# Patient Record
Sex: MALE | Race: WHITE | Employment: FULL TIME | URBAN - METROPOLITAN AREA
[De-identification: names, ages, dates, MRNs, and addresses within clinical notes are randomized per-mention and may not be internally consistent; named-entity substitution may affect disease eponyms.]

---

## 2018-04-13 ENCOUNTER — ANESTHESIA EVENT (OUTPATIENT)
Dept: SURGERY | Age: 38
End: 2018-04-13
Payer: COMMERCIAL

## 2018-04-13 ENCOUNTER — HOSPITAL ENCOUNTER (OUTPATIENT)
Age: 38
Setting detail: OUTPATIENT SURGERY
Discharge: HOME | End: 2018-04-13
Attending: ORTHOPAEDIC SURGERY | Admitting: ORTHOPAEDIC SURGERY
Payer: COMMERCIAL

## 2018-04-13 ENCOUNTER — ANESTHESIA (OUTPATIENT)
Dept: SURGERY | Age: 38
End: 2018-04-13
Payer: COMMERCIAL

## 2018-04-13 VITALS
WEIGHT: 195 LBS | HEIGHT: 69 IN | SYSTOLIC BLOOD PRESSURE: 112 MMHG | RESPIRATION RATE: 12 BRPM | TEMPERATURE: 97.3 F | OXYGEN SATURATION: 95 % | DIASTOLIC BLOOD PRESSURE: 71 MMHG | BODY MASS INDEX: 28.88 KG/M2 | HEART RATE: 88 BPM

## 2018-04-13 PROCEDURE — 77030003666 HC NDL SPINAL BD -A: Performed by: ORTHOPAEDIC SURGERY

## 2018-04-13 PROCEDURE — 77030027384 HC PRB ARTHSCP SERFAS STRY -C: Performed by: ORTHOPAEDIC SURGERY

## 2018-04-13 PROCEDURE — 76210000063 HC OR PH I REC FIRST 0.5 HR: Performed by: ORTHOPAEDIC SURGERY

## 2018-04-13 PROCEDURE — 77030018673: Performed by: ORTHOPAEDIC SURGERY

## 2018-04-13 PROCEDURE — 77030034139 HC NDL ENDOSC TRUPASS SGL S&N -C: Performed by: ORTHOPAEDIC SURGERY

## 2018-04-13 PROCEDURE — 74011250636 HC RX REV CODE- 250/636: Performed by: ORTHOPAEDIC SURGERY

## 2018-04-13 PROCEDURE — 77030016570 HC BLNKT BAIR HGGR 3M -B: Performed by: ANESTHESIOLOGY

## 2018-04-13 PROCEDURE — 77030033005 HC TBNG ARTHSC PMP STRY -B: Performed by: ORTHOPAEDIC SURGERY

## 2018-04-13 PROCEDURE — 77030020143 HC AIRWY LARYN INTUB CGAS -A: Performed by: ANESTHESIOLOGY

## 2018-04-13 PROCEDURE — 77030002933 HC SUT MCRYL J&J -A: Performed by: ORTHOPAEDIC SURGERY

## 2018-04-13 PROCEDURE — 76010010054 HC POST OP PAIN BLOCK: Performed by: ORTHOPAEDIC SURGERY

## 2018-04-13 PROCEDURE — 77030035253 HC BIT DRL ICONIX DISP STRY -B: Performed by: ORTHOPAEDIC SURGERY

## 2018-04-13 PROCEDURE — C1713 ANCHOR/SCREW BN/BN,TIS/BN: HCPCS | Performed by: ORTHOPAEDIC SURGERY

## 2018-04-13 PROCEDURE — 74011250637 HC RX REV CODE- 250/637: Performed by: ANESTHESIOLOGY

## 2018-04-13 PROCEDURE — 76010000161 HC OR TIME 1 TO 1.5 HR INTENSV-TIER 1: Performed by: ORTHOPAEDIC SURGERY

## 2018-04-13 PROCEDURE — 76210000020 HC REC RM PH II FIRST 0.5 HR: Performed by: ORTHOPAEDIC SURGERY

## 2018-04-13 PROCEDURE — 77030003602 HC NDL NRV BLK BBMI -B: Performed by: ANESTHESIOLOGY

## 2018-04-13 PROCEDURE — 77030032490 HC SLV COMPR SCD KNE COVD -B: Performed by: ORTHOPAEDIC SURGERY

## 2018-04-13 PROCEDURE — 74011250636 HC RX REV CODE- 250/636: Performed by: ANESTHESIOLOGY

## 2018-04-13 PROCEDURE — 77030010427: Performed by: ORTHOPAEDIC SURGERY

## 2018-04-13 PROCEDURE — 77030018836 HC SOL IRR NACL ICUM -A: Performed by: ORTHOPAEDIC SURGERY

## 2018-04-13 PROCEDURE — 77030019605: Performed by: ORTHOPAEDIC SURGERY

## 2018-04-13 PROCEDURE — 76942 ECHO GUIDE FOR BIOPSY: CPT | Performed by: ORTHOPAEDIC SURGERY

## 2018-04-13 PROCEDURE — 77030004453 HC BUR SHV STRY -B: Performed by: ORTHOPAEDIC SURGERY

## 2018-04-13 PROCEDURE — 74011250636 HC RX REV CODE- 250/636

## 2018-04-13 PROCEDURE — 74011000250 HC RX REV CODE- 250

## 2018-04-13 PROCEDURE — 76060000033 HC ANESTHESIA 1 TO 1.5 HR: Performed by: ORTHOPAEDIC SURGERY

## 2018-04-13 PROCEDURE — 77030006668 HC BLD SHV MENSCS STRY -B: Performed by: ORTHOPAEDIC SURGERY

## 2018-04-13 PROCEDURE — 77030033073 HC TBNG ARTHSC PMP OUTFLO STRY -B: Performed by: ORTHOPAEDIC SURGERY

## 2018-04-13 PROCEDURE — 77030010430: Performed by: ORTHOPAEDIC SURGERY

## 2018-04-13 DEVICE — MULTIFIX S-ULTRA 5.5MM KNOTLESS ANCHOR
Type: IMPLANTABLE DEVICE | Site: SHOULDER | Status: FUNCTIONAL
Brand: MULTIFIX

## 2018-04-13 DEVICE — 1.4MM ICONIX 1 ANCHOR W/INTELLIBRAID TECHNOLOGY 1 STRAND #2 FORCE FIBER
Type: IMPLANTABLE DEVICE | Site: SHOULDER | Status: FUNCTIONAL
Brand: ICONIX

## 2018-04-13 DEVICE — ICONIX 1 TT WITH INTELLIBRAID TECHNOLOGY, 1.4MM ANCHOR WITH 1 STRAND 1.2MM XBRAID TT
Type: IMPLANTABLE DEVICE | Site: SHOULDER | Status: FUNCTIONAL
Brand: ICONIX

## 2018-04-13 RX ORDER — HYDROMORPHONE HYDROCHLORIDE 2 MG/ML
0.5 INJECTION, SOLUTION INTRAMUSCULAR; INTRAVENOUS; SUBCUTANEOUS
Status: DISCONTINUED | OUTPATIENT
Start: 2018-04-13 | End: 2018-04-13 | Stop reason: HOSPADM

## 2018-04-13 RX ORDER — NALOXONE HYDROCHLORIDE 0.4 MG/ML
0.2 INJECTION, SOLUTION INTRAMUSCULAR; INTRAVENOUS; SUBCUTANEOUS AS NEEDED
Status: DISCONTINUED | OUTPATIENT
Start: 2018-04-13 | End: 2018-04-13 | Stop reason: HOSPADM

## 2018-04-13 RX ORDER — CEFAZOLIN SODIUM/WATER 2 G/20 ML
2 SYRINGE (ML) INTRAVENOUS ONCE
Status: COMPLETED | OUTPATIENT
Start: 2018-04-13 | End: 2018-04-13

## 2018-04-13 RX ORDER — LIDOCAINE HYDROCHLORIDE 20 MG/ML
INJECTION, SOLUTION EPIDURAL; INFILTRATION; INTRACAUDAL; PERINEURAL AS NEEDED
Status: DISCONTINUED | OUTPATIENT
Start: 2018-04-13 | End: 2018-04-13 | Stop reason: HOSPADM

## 2018-04-13 RX ORDER — ONDANSETRON 2 MG/ML
INJECTION INTRAMUSCULAR; INTRAVENOUS AS NEEDED
Status: DISCONTINUED | OUTPATIENT
Start: 2018-04-13 | End: 2018-04-13 | Stop reason: HOSPADM

## 2018-04-13 RX ORDER — FAMOTIDINE 20 MG/1
20 TABLET, FILM COATED ORAL ONCE
Status: COMPLETED | OUTPATIENT
Start: 2018-04-13 | End: 2018-04-13

## 2018-04-13 RX ORDER — MIDAZOLAM HYDROCHLORIDE 1 MG/ML
2 INJECTION, SOLUTION INTRAMUSCULAR; INTRAVENOUS
Status: DISCONTINUED | OUTPATIENT
Start: 2018-04-13 | End: 2018-04-13 | Stop reason: HOSPADM

## 2018-04-13 RX ORDER — FENTANYL CITRATE 50 UG/ML
INJECTION, SOLUTION INTRAMUSCULAR; INTRAVENOUS AS NEEDED
Status: DISCONTINUED | OUTPATIENT
Start: 2018-04-13 | End: 2018-04-13 | Stop reason: HOSPADM

## 2018-04-13 RX ORDER — SODIUM CHLORIDE, SODIUM LACTATE, POTASSIUM CHLORIDE, CALCIUM CHLORIDE 600; 310; 30; 20 MG/100ML; MG/100ML; MG/100ML; MG/100ML
75 INJECTION, SOLUTION INTRAVENOUS CONTINUOUS
Status: DISCONTINUED | OUTPATIENT
Start: 2018-04-13 | End: 2018-04-13 | Stop reason: HOSPADM

## 2018-04-13 RX ORDER — EPINEPHRINE 1 MG/ML
INJECTION, SOLUTION, CONCENTRATE INTRAVENOUS AS NEEDED
Status: DISCONTINUED | OUTPATIENT
Start: 2018-04-13 | End: 2018-04-13 | Stop reason: HOSPADM

## 2018-04-13 RX ORDER — DEXAMETHASONE SODIUM PHOSPHATE 4 MG/ML
INJECTION, SOLUTION INTRA-ARTICULAR; INTRALESIONAL; INTRAMUSCULAR; INTRAVENOUS; SOFT TISSUE AS NEEDED
Status: DISCONTINUED | OUTPATIENT
Start: 2018-04-13 | End: 2018-04-13 | Stop reason: HOSPADM

## 2018-04-13 RX ORDER — PROPOFOL 10 MG/ML
INJECTION, EMULSION INTRAVENOUS AS NEEDED
Status: DISCONTINUED | OUTPATIENT
Start: 2018-04-13 | End: 2018-04-13 | Stop reason: HOSPADM

## 2018-04-13 RX ORDER — SODIUM CHLORIDE 0.9 % (FLUSH) 0.9 %
5-10 SYRINGE (ML) INJECTION AS NEEDED
Status: DISCONTINUED | OUTPATIENT
Start: 2018-04-13 | End: 2018-04-13 | Stop reason: HOSPADM

## 2018-04-13 RX ORDER — LIDOCAINE HYDROCHLORIDE 10 MG/ML
0.1 INJECTION INFILTRATION; PERINEURAL AS NEEDED
Status: DISCONTINUED | OUTPATIENT
Start: 2018-04-13 | End: 2018-04-13 | Stop reason: HOSPADM

## 2018-04-13 RX ORDER — OXYCODONE AND ACETAMINOPHEN 5; 325 MG/1; MG/1
1 TABLET ORAL AS NEEDED
Status: DISCONTINUED | OUTPATIENT
Start: 2018-04-13 | End: 2018-04-13 | Stop reason: HOSPADM

## 2018-04-13 RX ADMIN — SODIUM CHLORIDE, SODIUM LACTATE, POTASSIUM CHLORIDE, AND CALCIUM CHLORIDE: 600; 310; 30; 20 INJECTION, SOLUTION INTRAVENOUS at 13:44

## 2018-04-13 RX ADMIN — LIDOCAINE HYDROCHLORIDE 85 MG: 20 INJECTION, SOLUTION EPIDURAL; INFILTRATION; INTRACAUDAL; PERINEURAL at 13:51

## 2018-04-13 RX ADMIN — SODIUM CHLORIDE, SODIUM LACTATE, POTASSIUM CHLORIDE, AND CALCIUM CHLORIDE: 600; 310; 30; 20 INJECTION, SOLUTION INTRAVENOUS at 14:26

## 2018-04-13 RX ADMIN — Medication 2 G: at 13:47

## 2018-04-13 RX ADMIN — ONDANSETRON 4 MG: 2 INJECTION INTRAMUSCULAR; INTRAVENOUS at 14:19

## 2018-04-13 RX ADMIN — SODIUM CHLORIDE, SODIUM LACTATE, POTASSIUM CHLORIDE, AND CALCIUM CHLORIDE 75 ML/HR: 600; 310; 30; 20 INJECTION, SOLUTION INTRAVENOUS at 12:45

## 2018-04-13 RX ADMIN — PROPOFOL 200 MG: 10 INJECTION, EMULSION INTRAVENOUS at 13:51

## 2018-04-13 RX ADMIN — DEXAMETHASONE SODIUM PHOSPHATE 10 MG: 4 INJECTION, SOLUTION INTRA-ARTICULAR; INTRALESIONAL; INTRAMUSCULAR; INTRAVENOUS; SOFT TISSUE at 14:03

## 2018-04-13 RX ADMIN — FAMOTIDINE 20 MG: 20 TABLET ORAL at 12:40

## 2018-04-13 RX ADMIN — MIDAZOLAM HYDROCHLORIDE 2 MG: 1 INJECTION, SOLUTION INTRAMUSCULAR; INTRAVENOUS at 13:45

## 2018-04-13 RX ADMIN — MIDAZOLAM HYDROCHLORIDE 2 MG: 1 INJECTION, SOLUTION INTRAMUSCULAR; INTRAVENOUS at 13:18

## 2018-04-13 RX ADMIN — FENTANYL CITRATE 50 MCG: 50 INJECTION, SOLUTION INTRAMUSCULAR; INTRAVENOUS at 14:06

## 2018-04-13 RX ADMIN — FENTANYL CITRATE 50 MCG: 50 INJECTION, SOLUTION INTRAMUSCULAR; INTRAVENOUS at 13:51

## 2018-04-13 NOTE — ANESTHESIA POSTPROCEDURE EVALUATION
Post-Anesthesia Evaluation and Assessment    Patient: Nirmal Vazquez MRN: [id number]  SSN: [id number]    YOB: 1980  Age: 42 y.o. Sex: male       Cardiovascular Function/Vital Signs  Visit Vitals    /68    Pulse 75    Temp 36.3 °C (97.3 °F)    Resp 12    Ht 5' 9\" (1.753 m)    Wt 88.5 kg (195 lb)    SpO2 90%    BMI 28.8 kg/m2       Patient is status post general anesthesia for Procedure(s):  LEFT SHOULDER ARTHROSCOPY ROTATOR CUFF REPAIR, Distal Clavicle Excision, Subacromial Decompression, Labral repair  GEN/SCAL. Nausea/Vomiting: None    Postoperative hydration reviewed and adequate. Pain:  Pain Scale 1: Visual (04/13/18 1508)  Pain Intensity 1: 0 (04/13/18 1508)   Managed    Neurological Status:   Neuro (WDL): Exceptions to WDL (04/13/18 1508)  Neuro  Neurologic State: Drowsy (04/13/18 1508)  LUE Motor Response: Pharmacologically paralyzed (04/13/18 1508)   At baseline    Mental Status and Level of Consciousness: Arousable    Pulmonary Status:   O2 Device: Nasal cannula (04/13/18 1508)   Adequate oxygenation and airway patent    Complications related to anesthesia: None    Post-anesthesia assessment completed.  No concerns    Signed By: Madiha Chaudhari MD     April 13, 2018

## 2018-04-13 NOTE — IP AVS SNAPSHOT
[address] [facility]       [address]  [phone]     Patient: Scotty Briggs  MRN: [id number]  :1980           About your hospitalization     You were admitted on:  2018 You last received care in the:  [facility] OP PACU    You were discharged on:  2018       Why you were hospitalized     Your primary diagnosis was:  Not on File      Follow-up Information     Follow up With Details Comments Sanjuanita Tapia MD Call today His office will call to schedule follow up appointment. Call with any questions or concerns. [address] [facility] [address]  [phone]        Your Scheduled Appointments     2018 10:00 AM EDT   Physicial Therapy with Nate Haley PT, DPT   [facility] (Anabel Santana)    [address] [facility] [phone]              Discharge Orders     None       A check alma indicates which time of day the medication should be taken. My Medications      Notice     You have not been prescribed any medications. Discharge Instructions        Post-Operative Instructions   For  Shoulder Arthroscopy Rotator Cuff/ Labral Repair  Phone:  [phone]    1. If you do not have an \"Iceman\" type cooling unit, for the first 48-72 hours following surgery, use ice on the shoulder every two hours (while awake) for 20-30 minutes at a time to help prevent swelling and lessen pain. If you have a cooling unit, follow the instructions given to you- continually as much as possible the first 48-72 hours, then 3-4 times a day for 4 weeks. 2. You may shower after the arthroscopy. Keep dressings in place for the first three days. After three days, you may remove the dressings and leave the steri-strip bandages in place; they will peel off naturally. 3. Stay in sling at all times except to shower.     4. Begin therapy as ordered. 5. Use any pain medication as instructed. You should take your pain medication as soon as you feel the anesthetic wearing off. Do not wait until you are in severe pain to begin taking your pain medication. 6. You may have some side effects from your pain medication. If you have nausea, try taking your medication with food. For itching, you may take over the counter Benadryl. 7. You may have been given a prescription for Zofran or Phenergan. This medication is used for nausea and vomiting. You do not need to get this prescription filled unless you have a problem. 8. If you have a problem, please call [address] [place] [address] at [phone]    [address] [place] [address], P.A[facility] ACTIVITY  · As tolerated and as directed by your doctor. · Bathe or shower as directed by your doctor. DIET  · Clear liquids until no nausea or vomiting; then light diet for the first day. · Advance to regular diet on second day, unless your doctor orders otherwise. · If nausea and vomiting continues, call your doctor. PAIN  · Take pain medication as directed by your doctor. · Call your doctor if pain is NOT relieved by medication. · DO NOT take aspirin of blood thinners unless directed by your doctor. DRESSING CARE       CALL YOUR DOCTOR IF   · Excessive bleeding that does not stop after holding pressure over the area  · Temperature of 101 degrees F or above  · Excessive redness, swelling or bruising, and/ or green or yellow, smelly discharge from incision      After general anesthesia or intravenous sedation, for 24 hours or while taking prescription Narcotics:  · Limit your activities  · Do not drive and operate hazardous machinery  · Do not make important personal or business decisions  · Do  not drink alcoholic beverages  · If you have not urinated within 8 hours after discharge, please contact your surgeon on call.     *  Please give a list of your current medications to your Primary Care Provider. *  Please update this list whenever your medications are discontinued, doses are      changed, or new medications (including over-the-counter products) are added. *  Please carry medication information at all times in case of emergency situations. These are general instructions for a healthy lifestyle:    No smoking/ No tobacco products/ Avoid exposure to second hand smoke    Surgeon General's Warning:  Quitting smoking now greatly reduces serious risk to your health. Obesity, smoking, and sedentary lifestyle greatly increases your risk for illness    A healthy diet, regular physical exercise & weight monitoring are important for maintaining a healthy lifestyle    You may be retaining fluid if you have a history of heart failure or if you experience any of the following symptoms:  Weight gain of 3 pounds or more overnight or 5 pounds in a week, increased swelling in our hands or feet or shortness of breath while lying flat in bed. Please call your doctor as soon as you notice any of these symptoms; do not wait until your next office visit. Recognize signs and symptoms of STROKE:    F-face looks uneven    A-arms unable to move or move unevenly    S-speech slurred or non-existent    T-time-call 911 as soon as signs and symptoms begin-DO NOT go       Back to bed or wait to see if you get better-TIME IS BRAIN. Introducing [facility] & HEALTH SERVICES! Glory Gray introduces [employer] patient portal. Now you can access parts of your medical record, email your doctor's office, and request medication refills online. 1. In your internet browser, go to [email or web address]   2. Click on the First Time User? Click Here link in the Sign In box. You will see the New Member Sign Up page. 3. Enter your [employer] Access Code exactly as it appears below. You will not need to use this code after youve completed the sign-up process.  If you do not sign up before the expiration date, you must request a new code. · [employer] Access Code: [email or web address]  Expires: 7/10/2018 10:26 AM    4. Enter the last four digits of your Social Security Number (xxxx) and Date of Birth (mm/dd/yyyy) as indicated and click Submit. You will be taken to the next sign-up page. 5. Create a [employer] ID. This will be your [employer] login ID and cannot be changed, so think of one that is secure and easy to remember. 6. Create a [employer] password. You can change your password at any time. 7. Enter your Password Reset Question and Answer. This can be used at a later time if you forget your password. 8. Enter your e-mail address. You will receive e-mail notification when new information is available in [address] 9. Click Sign Up. You can now view and download portions of your medical record. 10. Click the Download Summary menu link to download a portable copy of your medical information. If you have questions, please visit the Frequently Asked Questions section of the [employer] website. Remember, [employer] is NOT to be used for urgent needs. For medical emergencies, dial 911. Now available from your iPhone and Android! Introducing Dom Lawler       As a [employer] patient, I wanted to make you aware of our electronic visit tool called Dom Lawler. [employer] 24/7 allows you to connect within minutes with a medical provider 24 hours a day, seven days a week via a mobile device or tablet or logging into a secure website from your computer. You can access Dom Lawler from anywhere in the [place]. A virtual visit might be right for you when you have a simple condition and feel like you just dont want to get out of bed, or cant get away from work for an appointment, when your regular [employer] provider is not available (evenings, weekends or holidays), or when youre out of town and need minor care.   Electronic visits cost only $49 and if the [facility] [employer] 24/7 provider determines a prescription is needed to treat your condition, one can be electronically transmitted to a nearby pharmacy*. Please take a moment to enroll today if you have not already done so. The enrollment process is free and takes just a few minutes. To enroll, please download the [employer] 24/7 usama to your tablet or phone, or visit [email or web address] to enroll on your computer. And, as an [address] [place] [address] patient with a [employer] account, the results of your visits will be scanned into your electronic medical record and your primary care provider will be able to view the scanned results. We urge you to continue to see your regular Nicolasabennie Alfonso provider for your ongoing medical care. And while your primary care provider may not be the one available when you seek a Porphyriovenkateshfin virtual visit, the peace of mind you get from getting a real diagnosis real time can be priceless. For more information on Dom Fingovenkateshfin, view our Frequently Asked Questions (FAQs) at [email or web address].     Sincerely,    Goran Yen MD  Chief Medical Officer  [facility]     *:  certain medications cannot be prescribed via [employer] 24/7         Providers Seen During Your Hospitalization     Provider Specialty Primary office phone    Jose A Chandler MD Orthopedic Surgery [phone]      Your Primary Care Physician (PCP)     Primary Care Physician Office Phone Office Fax    NONE ** None ** ** None **      You are allergic to the following     Allergen Reactions    Sulfa (Sulfonamide Antibiotics) Unknown (comments)      Recent Documentation     Height Weight BMI Smoking Status          1.753 m 88.5 kg 28.8 kg/m2 Never Smoker           Emergency Contacts       Name Discharge Info Relation Home Work Mobile    Juan  Spouse [3] [phone]      Patient Belongings     The following personal items are in your possession at time of discharge:    Dental Appliances: None  Visual Aid: None      Home Medications: None   Jewelry: None  Clothing: Shirt, Pants, Footwear, Undergarments, Socks    Other Valuables: None              Please provide this summary of care documentation to your next provider. Signatures-by signing, you are acknowledging that this After Visit Summary has been reviewed with you and you have received a copy.                       Patient Signature:  ____________________________________________________________    Date:  ____________________________________________________________      `           Provider Signature:  ____________________________________________________________    Date:  ____________________________________________________________

## 2018-04-13 NOTE — DISCHARGE INSTRUCTIONS
Post-Operative Instructions   For  Shoulder Arthroscopy Rotator Cuff/ Labral Repair  Phone:  [phone]    1. If you do not have an \"Iceman\" type cooling unit, for the first 48-72 hours following surgery, use ice on the shoulder every two hours (while awake) for 20-30 minutes at a time to help prevent swelling and lessen pain. If you have a cooling unit, follow the instructions given to you- continually as much as possible the first 48-72 hours, then 3-4 times a day for 4 weeks. 2. You may shower after the arthroscopy. Keep dressings in place for the first three days. After three days, you may remove the dressings and leave the steri-strip bandages in place; they will peel off naturally. 3. Stay in sling at all times except to shower. 4. Begin therapy as ordered. 5. Use any pain medication as instructed. You should take your pain medication as soon as you feel the anesthetic wearing off. Do not wait until you are in severe pain to begin taking your pain medication. 6. You may have some side effects from your pain medication. If you have nausea, try taking your medication with food. For itching, you may take over the counter Benadryl. 7. You may have been given a prescription for Zofran or Phenergan. This medication is used for nausea and vomiting. You do not need to get this prescription filled unless you have a problem. 8. If you have a problem, please call [address] [place] [address] at [phone]    [address] [place] [address], P.A. ACTIVITY  · As tolerated and as directed by your doctor. · Bathe or shower as directed by your doctor. DIET  · Clear liquids until no nausea or vomiting; then light diet for the first day. · Advance to regular diet on second day, unless your doctor orders otherwise. · If nausea and vomiting continues, call your doctor. PAIN  · Take pain medication as directed by your doctor.    · Call your doctor if pain is NOT relieved by medication. · DO NOT take aspirin of blood thinners unless directed by your doctor. DRESSING CARE       CALL YOUR DOCTOR IF   · Excessive bleeding that does not stop after holding pressure over the area  · Temperature of 101 degrees F or above  · Excessive redness, swelling or bruising, and/ or green or yellow, smelly discharge from incision      After general anesthesia or intravenous sedation, for 24 hours or while taking prescription Narcotics:  · Limit your activities  · Do not drive and operate hazardous machinery  · Do not make important personal or business decisions  · Do  not drink alcoholic beverages  · If you have not urinated within 8 hours after discharge, please contact your surgeon on call. *  Please give a list of your current medications to your Primary Care Provider. *  Please update this list whenever your medications are discontinued, doses are      changed, or new medications (including over-the-counter products) are added. *  Please carry medication information at all times in case of emergency situations. These are general instructions for a healthy lifestyle:    No smoking/ No tobacco products/ Avoid exposure to second hand smoke    Surgeon General's Warning:  Quitting smoking now greatly reduces serious risk to your health. Obesity, smoking, and sedentary lifestyle greatly increases your risk for illness    A healthy diet, regular physical exercise & weight monitoring are important for maintaining a healthy lifestyle    You may be retaining fluid if you have a history of heart failure or if you experience any of the following symptoms:  Weight gain of 3 pounds or more overnight or 5 pounds in a week, increased swelling in our hands or feet or shortness of breath while lying flat in bed. Please call your doctor as soon as you notice any of these symptoms; do not wait until your next office visit.     Recognize signs and symptoms of STROKE:    F-face looks uneven    A-arms unable to move or move unevenly    S-speech slurred or non-existent    T-time-call 911 as soon as signs and symptoms begin-DO NOT go       Back to bed or wait to see if you get better-TIME IS BRAIN.

## 2018-04-13 NOTE — ANESTHESIA PREPROCEDURE EVALUATION
Anesthetic History   No history of anesthetic complications            Review of Systems / Medical History  Patient summary reviewed and pertinent labs reviewed    Pulmonary  Within defined limits                 Neuro/Psych   Within defined limits           Cardiovascular                  Exercise tolerance: >4 METS     GI/Hepatic/Renal  Within defined limits              Endo/Other  Within defined limits           Other Findings              Physical Exam    Airway  Mallampati: II  TM Distance: 4 - 6 cm  Neck ROM: normal range of motion   Mouth opening: Normal     Cardiovascular    Rhythm: regular           Dental  No notable dental hx       Pulmonary  Breath sounds clear to auscultation               Abdominal         Other Findings            Anesthetic Plan    ASA: 2  Anesthesia type: general      Post-op pain plan if not by surgeon: peripheral nerve block single    Induction: Intravenous  Anesthetic plan and risks discussed with: Patient

## 2018-04-13 NOTE — ANESTHESIA PROCEDURE NOTES
Peripheral Block    Start time: 4/13/2018 1:18 PM  End time: 4/13/2018 1:22 PM  Performed by: Ivan Alonzo  Authorized by: Ivan Alonzo       Pre-procedure: Indications: at surgeon's request and post-op pain management    Preanesthetic Checklist: patient identified, risks and benefits discussed, site marked, timeout performed, anesthesia consent given and patient being monitored    Timeout Time: 13:18          Block Type:   Block Type:   Interscalene  Laterality:  Left  Monitoring:  Standard ASA monitoring, responsive to questions, oxygen, continuous pulse ox, frequent vital sign checks and heart rate  Injection Technique:  Single shot  Procedures: ultrasound guided    Patient Position: seated  Prep: chlorhexidine    Location:  Interscalene  Needle Type:  Stimuplex  Needle Gauge:  22 G  Needle Localization:  Ultrasound guidance  Medication Injected:  0.375%  bupivacaine  Adds:  Epi 1:400K  Volume (mL):  25  mepivacaine  Volume (mL):  10    Assessment:  Number of attempts:  1  Injection Assessment:  Incremental injection every 5 mL, negative aspiration for CSF, no paresthesia, ultrasound image on chart, no intravascular symptoms, negative aspiration for blood and local visualized surrounding nerve on ultrasound  Patient tolerance:  Patient tolerated the procedure well with no immediate complications

## 2018-04-13 NOTE — H&P
Outpatient Surgery History and Physical:  Salma Hernandes was seen and examined. CHIEF COMPLAINT:   Left shoulder pain. PE:     Visit Vitals    /81 (BP 1 Location: Right arm, BP Patient Position: Supine)    Pulse 74    Temp 98 °F (36.7 °C)    Resp 18    Wt 88.5 kg (195 lb)    SpO2 97%    BMI 28.8 kg/m2       Heart:   Regular rhythm      Lungs:  Are clear      Past Medical History:    Patient Active Problem List    Diagnosis    Calculus of gallbladder without cholecystitis without obstruction       Surgical History:   Past Surgical History:   Procedure Laterality Date    HX LAP CHOLECYSTECTOMY  2016       Social History: Patient  reports that he has never smoked. He has never used smokeless tobacco. He reports that he drinks alcohol. He reports that he does not use illicit drugs. Family History:   Family History   Problem Relation Age of Onset    Diabetes Mother     Hypertension Father     Diabetes Sister        Allergies: Reviewed per EMR  Allergies   Allergen Reactions    Sulfa (Sulfonamide Antibiotics) Unknown (comments)       Medications:    No current facility-administered medications on file prior to encounter. No current outpatient prescriptions on file prior to encounter. The surgery is planned for the left shoulder. History and physical has been reviewed. The patient has been examined. There have been no significant clinical changes since the completion of the originally dated History and Physical.  Patient identified by surgeon; surgical site was confirmed by patient and surgeon. The patient is here today for outpatient surgery. I have examined the patient, no changes are noted in the patient's medical status. Necessity for the procedure/care is still present and the history and physical above is current. See the office notes for the full long term history of the problem.   Please see the recent office notes for the musculoskeletal examination.     Signed By: RAFAL Ho     April 13, 2018 1:22 PM

## 2018-04-16 NOTE — OP NOTES
[facility] REPORT    Jerad Covington  MR#: [id number]  : 1980  ACCOUNT #: [de-identified]   DATE OF SERVICE: 2018    PREOPERATIVE DIAGNOSES  1. Rotator cuff tear. 2.  Anterior labral tear. 3.  Acromioclavicular arthritis. 4.  Chondromalacia of glenoid. 5.  Impingement, left shoulder. POSTOPERATIVE DIAGNOSES   1. Rotator cuff tear. 2.  Anterior labral tear. 3.  Acromioclavicular arthritis. 4.  Chondromalacia of glenoid. 5.  Impingement, left shoulder. OPERATION PERFORMED  1. Arthroscopic rotator cuff repair. 2.  Arthroscopic labral repair. 3.  Arthroscopic distal clavicle excision ([place] procedure). 4.  Abrasion arthroplasty, glenoid. 5.  Arthroscopic subacromial decompression, left shoulder. SURGEON:  Annalee Shields MD      ASSISTANTS:  RAFAL Campa    ANESTHESIA:  General.    FLUIDS:  Crystalloid. ESTIMATED BLOOD LOSS:  Minimal.    SPECIMENS:  None. COMPLICATIONS:  None. IMPLANTS:  Yes, see record. FINDINGS:  There was a 1 cm full thickness rotator cuff tear. There was an anterior inferior labral tear. There was grade II/III/IV chondromalacia of the anterior glenoid. There was an anterior inferior acromial slope. There was undersurface osteophyte formation and degenerative change of the distal clavicle at the [facility] joint. DESCRIPTION OF PROCEDURE:  After informed consent, the patient was taken to the operating room and placed in supine position. General endotracheal anesthesia was administered without difficulty. The patient was placed in lateral decubitus position. All pressure points were inspected and padded appropriately. The left upper extremity was suspended by traction. Left shoulder was prepped and draped in a sterile fashion. The glenohumeral joint was insufflated with 50 mL of sterile saline and a posterior portal was established.   Glenohumeral joint was arthroscoped in a sequential manner. The aforementioned findings were noted. An anterior portal was established and a chondroplasty of the glenoid was performed. All loose cartilage flaps were removed. There were no sharp edges or unstable fragments after the chondroplasty of the glenoid. There was an area of exposed bone and a contained defect. An abrasion arthroplasty was performed down to bleeding subchondral bone without difficulty. Attention was then directed to the labral tear. The labrum was anatomically reduced and it was fixed with Iconix suture anchors and excellent purchase. These were placed in the original insertion at the articular margin. Anatomic repair of the anterior inferior labrum was performed. The labral repair was performed without difficulty. The labrum was stable on probing after the repair. The scope was brought in the subacromial space. A lateral portal was established. Partial closure of tissue was excised. The undersurface of the acromion was exposed and then acromioplasty was performed. All of the acromion anterior to the leading edge of distal clavicle was excised to a depth of 5-6 mm and 2 cm anterior to posterior direction was removed. This opened up the subacromial space nicely. Attention was then directed to the distal clavicle to both anterolateral portals. A circumferential distal clavicle excision was performed and approximately 10 mm of distal clavicle was excised. Circumferential excision was verified arthroscopically and a [place] procedure was performed without difficulty. Attention was then directed to the rotator cuff. The tear was easily mobilized back to its original insertion and the area underneath the original insertion was lightly decorticated. A Multifix anchor and Ultratape sutures in a mattress fashion were used to completely repair the tear. Anatomic rotator cuff repair was performed. The rotator cuff tear was repaired without difficulty.   Shoulder was thoroughly irrigated. Portals were closed. Sterile dressings were applied. The patient was taken to recovery in stable condition. RAFAL [place], assisted during the procedure and was necessary for patient positioning during the procedure, wound closure and assistance with the major portions of the operation. His presence decreased the operative time and potential complication rate.       MD CAPRICE Gil / [place]  D: 04/16/2018 07:48     T: 04/16/2018 08:18  JOB #: [id number]

## 2018-04-20 ENCOUNTER — APPOINTMENT (OUTPATIENT)
Dept: PHYSICAL THERAPY | Age: 38
End: 2018-04-20

## 2018-04-20 ENCOUNTER — HOSPITAL ENCOUNTER (OUTPATIENT)
Dept: PHYSICAL THERAPY | Age: 38
Discharge: HOME | End: 2018-04-20
Payer: COMMERCIAL

## 2018-04-20 PROCEDURE — 97161 PT EVAL LOW COMPLEX 20 MIN: CPT

## 2018-04-20 PROCEDURE — 97110 THERAPEUTIC EXERCISES: CPT

## 2018-04-20 NOTE — THERAPY EVALUATION
Cuco Cardona  : 1980  Primary: Jay Pin Of [facility] Amaya*  Secondary:  [facility] [place] [address], [address], [place] [address]  [phone]   [phone]         OUTPATIENT PHYSICAL THERAPY:Initial Assessment 2018    ICD-10: Treatment Diagnosis: COMPLETE ROTATOR CUFF TEAR, NOT SPECIFIED AS TRAUMATIC M75.122;     Precautions/Allergies: sulfa drugs  Fall Risk Score: 1 (? 5 = High Risk)  MD Orders: evaluate and treat MEDICAL/REFERRING DIAGNOSIS:Left shoulder pain [M25.512]  DATE OF ONSET: Date of surgery 2018  REFERRING PHYSICIAN:Baumgarten, Sisto Cea, MD  RETURN PHYSICIAN APPOINTMENT: did not specify      INITIAL ASSESSMENT:  Mr. Jesus Butts presents to physical therapy with symptoms post rotator cuff and labral repair . The examination is of low complexity due to a stable clinical presentation. Skilled physical therapy is recommended to progress the plan of care in order for the patient to return to full function with minimal symptoms. PROBLEM LIST (Impacting functional limitations):  1. Decreased Strength  2. Decreased ADL/Functional Activities  3. Increased Pain  4. Decreased Activity Tolerance  5. Decreased Flexibility/Joint Mobility  6. Decreased [place] with Home Exercise Program INTERVENTIONS PLANNED:  1. Cold  2. Heat  3. Home Exercise Program (HEP)  4. Manual Therapy  5. Range of Motion (ROM)  6. Therapeutic Exercise/Strengthening     TREATMENT PLAN:  Effective Dates: 18 TO 18. Frequency/Duration: 2 times a week for 8 weeks  GOALS: (Goals have been discussed and agreed upon with patient.)  SHORT-TERM FUNCTIONAL GOALS: Time Frame: 4-8 wks  1. Patient will report 25-50% reduction in overall symptoms  2. Patient will be compliant with HEP and plan of care  3. Patient will have full PROM in the left shoulder as compared to the contralateral side  4.   Patient will improve on the DASH by 8-10 points  DISCHARGE GOALS: Time Frame: 9-12 wk  1. Patient will report % reduction in overall symptoms in order to be able to have full function with decreased symptoms  2. Patient will be able to lift 10lbs over his head with minimal symptoms   3. Patient will report being able to do all household and work related activities with minimal symptoms (0-1/10  4. Patient will score 25 points or less on the DASH order to demonstrate improved function with less pain    Rehabilitation Potential For Stated Goals: Good    Regarding [facility] therapy, I certify that the treatment plan above will be carried out by a therapist or under their direction. Thank you for this referral,  Judy Michael PT,DPT    Referring Physician Signature: Jett Orozco MD _____________________________________________________ Date: __________________________________          HISTORY:   History of Present Injury/Illness (Reason for Referral): reports that he was at [employer] about 3 wks ago and fell awkwardly on his left arm. He had immediate pain at that point. He had an MRI which revealed a rotator cuff tear and labral tear. Surgery was on April 13, 2018.    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  Current Medications:  None   Date Last Reviewed:  4-20-18   Number of Personal Factors/Comorbidities that affect the Plan of Care: 0: LOW COMPLEXITY   EXAMINATION:   (Abbreviations: P-pain, NP- no pain, wnl-within normal limits)  Observation/Orthostatic Postural Assessment:    Standing resting posture:  · Presents in a sling with abduction pillow  · Increased edema noted around the shoulder  · No sign of drainage or infection    Sitting resting posture:  · -  Palpation/tone/tissue texture:    Soft tissue:  · Upper traps: increased tone on L side    ROM:  (P-pain  NP-no pain)  Cervical ROM  (tested in sitting) Date:  4-20-18       Flexion wnl   Extension wnl   Rotation L wnl   Rotation R wnl     Shoulder ROM Date:  4-20-18       Right Left   Flexion wnl 20 deg   Abduction wnl n/a   IR wnl n/a   ER wnl 10 deg            Strength:   Shoulder strength Date:  4-20-18       Right Left   Flexion 5 n/a   Abduction 5 n/a   IR 5 n/a   ER 5 n/a        Scapular stability Date:  4-20-18       Right Left   Mid trap n/a n/a   Low trap n/a n/a   Rhomboids n/a n/a           Special Tests: -    Neurological Screen:   Myotomes: n/a         Dermatomes: n/a         Reflexes: -         Neural Tension Tests: ULTT (median): -  Functional Mobility:      Balance:-     Body Structures Involved:  1. Joints  2. Muscles  3. Ligaments Body Functions Affected:  1. Sensory/Pain  2. Neuromusculoskeletal  3. Movement Related Activities and Participation Affected:  1. General Tasks and Demands  2. Mobility  3. Self Care  4. Domestic Life  5. Interpersonal Interactions and Relationships  6. Community, Social and [place]   Number of elements that affect the Plan of Care: 4+: HIGH COMPLEXITY   CLINICAL PRESENTATION:   Presentation: Stable and uncomplicated: LOW COMPLEXITY   CLINICAL DECISION MAKING:   Outcome Measure: Tool Used: Disabilities of the Arm, Shoulder and Hand (DASH) Questionnaire - Quick Version  Score:  Initial: 42/55  Most Recent: X/55 (Date: -- )   Interpretation of Score: The DASH is designed to measure the activities of daily living in person's with upper extremity dysfunction or pain. Each section is scored on a 1-5 scale, 5 representing the greatest disability. The scores of each section are added together for a total score of 55. Score 11 12-19 20-28 29-37 38-45 46-54 55   Modifier CH CI CJ CK CL CM CN     Medical Necessity:   · Patient is expected to demonstrate progress in strength, range of motion and symptom levels to return to full function.   Reason for Services/Other Comments:  · Patient continues to require skilled intervention due to ongoing symptoms. Use of outcome tool(s) and clinical judgement create a POC that gives a: Clear prediction of patient's progress: LOW COMPLEXITY   TREATMENT:   (In addition to Assessment/Re-Assessment sessions the following treatments were rendered)    Subjective Pre-Treatment Symptoms: reports that he is doing okay with pain. Therapeutic Exercise: (10 min) Done in order to improve strength, ROM and understanding of current condition. Date:  4-20-18e   Activity/Exercise Parameters   Education Discussed examination findings, HEP, plan of care   Cervical and scapular ROM · Rotation bilateral, SB bilateral  · scap retractions, 3 sec hold, 5x   Elbow and wrist ROM Elbow extension, flexion,    Pendulums  discussed   Manual Therapy: (-) Done in order to improve joint and soft tissue mobility,reduce muscle guarding, and decrease muscle tone    Modalities: (15 min) Done in order to reduce swelling and pain  · vasopneumatic compression w/ cryotherapy, 15 min, 34 deg  Treatment/Session Assessment:  Patient tolerated the session well. His HEP and plan of care were discussed. · Pain: Initial:    3/10 at rest, 10/10 at worst Post Session:  3/10 ·   Compliance with Program/Exercises: Will assess as treatment progresses. · Recommendations/Intent for next treatment session: \"Next visit will focus on progressing the patients plan of care\".     Future Appointments  Date Time Provider Felix Mcgraw   4/27/2018 2:45 PM Dennise Kelsey PT, JENNIFERT [facility]   5/4/2018 10:00 AM Taya Hawkins PT, DPT [facility]   5/11/2018 9:15 AM Taya Hawkins PT, DPT [facility]   5/18/2018 9:15 AM [facility]     Total Treatment Duration: 25 min, evaluation 35 min  PT Patient Time In/Time Out  Time In: 0915  Time Out: 106 Angeles Elizondo PT, MARK

## 2018-04-25 NOTE — BRIEF OP NOTE
BRIEF OPERATIVE NOTE    Date of Procedure: 4/13/2018   Preoperative Diagnosis: Superior glenoid labrum lesion of left shoulder, initial encounter [S43.432A]  Postoperative Diagnosis: Superior glenoid labrum lesion of left shoulder, initial encounter [S43.432A], Rotator Cuff Tear< Impingement Syndrome    Procedure(s):  LEFT SHOULDER ARTHROSCOPY ROTATOR CUFF REPAIR, Distal Clavicle Excision, Subacromial Decompression, Labral repair  GEN/SCAL  Surgeon(s) and Role: Sudheer Francis MD - Primary         Surgical Assistant:     Surgical Staff:  Circ-1: Bolivar Sebastian RN  Physician Assistant: RAFAL Becker  Scrub Tech-1: Cristal Hernandez  Event Time In   Incision Start 1405   Incision Close 1456     Anesthesia: General   Estimated Blood Loss: min  Specimens: * No specimens in log *   Findings: labral tear   Complications: none  Implants:   Implant Name Type Inv.  Item Serial No.  Lot No. LRB No. Used Action   ANCHOR SUT 1.4MM FORCEFIBER 2 -- ICONIX 1 INTELLIBRAND - [id number]  ANCHOR SUT 1.4MM FORCEFIBER 2 -- ICONIX 1 INTELLIBRAND  [employer] [id number] Left 2 Implanted   ANCHOR SUT MULTIFIX 5.5MM --  - [id number]  ANCHOR SUT MULTIFIX 5.5MM --   [facility] [facility] [id number] Left 1 Implanted   ANCHOR SUT 1.4MM FORCEFIBER 2 -- ICONIX 1 INTELLIBRAND - [id number]   ANCHOR SUT 1.4MM FORCEFIBER 2 -- ICONIX 1 INTELLIBRAND   [employer] [id number] Left 1 Implanted

## 2018-04-25 NOTE — PROGRESS NOTES
Ambulatory/Rehab Services H2 Model Falls Risk Assessment    Risk Factor Pts. ·   Confusion/Disorientation/Impulsivity  []    4 ·   Symptomatic Depression  []   2 ·   Altered Elimination  []   1 ·   Dizziness/Vertigo  []   1 ·   Gender (Male)  [x]   1 ·   Any administered antiepileptics (anticonvulsants):  []   2 ·   Any administered benzodiazepines:  []   1 ·   Visual Impairment (specify):  []   1 ·   Portable Oxygen Use  []   1 ·   Orthostatic ? BP  []   1 ·   History of Recent Falls (within 3 mos.)  []   5     Ability to Rise from Chair (choose one) Pts. ·   Ability to rise in a single movement  [x]   0 ·   Pushes up, successful in one attempt  []   1 ·   Multiple attempts, but successful  []   3 ·   Unable to rise without assistance  []   4   Total: (5 or greater = High Risk) 1     Falls Prevention Plan:   []                Physical Limitations to Exercise (specify):   []                Mobility Assistance Device (type):   []                Exercise/Equipment Adaptation (specify):    ©2010 [facility] [address] [place] Patent #[id number].  Federal Law prohibits the replication, distribution or use without written permission from [facility] [employer]

## 2018-05-04 ENCOUNTER — HOSPITAL ENCOUNTER (OUTPATIENT)
Dept: PHYSICAL THERAPY | Age: 38
Discharge: HOME | End: 2018-05-04
Payer: COMMERCIAL

## 2018-05-04 PROCEDURE — 97110 THERAPEUTIC EXERCISES: CPT

## 2018-05-04 PROCEDURE — 97140 MANUAL THERAPY 1/> REGIONS: CPT

## 2018-05-04 NOTE — PROGRESS NOTES
Niko Brito  : 1980  Primary: Colleen Tucker Of Nehemias Amaya*  Secondary:  [facility] [place] [address], [address], [place] [address]  [phone]   [phone]         OUTPATIENT PHYSICAL THERAPY:Daily Note 2018    ICD-10: Treatment Diagnosis: COMPLETE ROTATOR CUFF TEAR, NOT SPECIFIED AS TRAUMATIC M75.122;     Precautions/Allergies: sulfa drugs  Fall Risk Score: 1 (? 5 = High Risk)  MD Orders: evaluate and treat MEDICAL/REFERRING DIAGNOSIS:Left shoulder pain [M25.512]  DATE OF ONSET: Date of surgery 2018  REFERRING PHYSICIAN:Baumgarten, Colman [place], MD  RETURN PHYSICIAN APPOINTMENT: did not specify      INITIAL ASSESSMENT:  Mr. Eleanore Sacks presents to physical therapy with symptoms post rotator cuff and labral repair . The examination is of low complexity due to a stable clinical presentation. Skilled physical therapy is recommended to progress the plan of care in order for the patient to return to full function with minimal symptoms. PROBLEM LIST (Impacting functional limitations):  1. Decreased Strength  2. Decreased ADL/Functional Activities  3. Increased Pain  4. Decreased Activity Tolerance  5. Decreased Flexibility/Joint Mobility  6. Decreased [place] with Home Exercise Program INTERVENTIONS PLANNED:  1. Cold  2. Heat  3. Home Exercise Program (HEP)  4. Manual Therapy  5. Range of Motion (ROM)  6. Therapeutic Exercise/Strengthening     TREATMENT PLAN:  Effective Dates: 18 TO 18. Frequency/Duration: 2 times a week for 8 weeks  GOALS: (Goals have been discussed and agreed upon with patient.)  SHORT-TERM FUNCTIONAL GOALS: Time Frame: 4-8 wks  1. Patient will report 25-50% reduction in overall symptoms  2. Patient will be compliant with HEP and plan of care  3. Patient will have full PROM in the left shoulder as compared to the contralateral side  4.   Patient will improve on the DASH by 8-10 points  DISCHARGE GOALS: Time Frame: 9-12 wk  1. Patient will report % reduction in overall symptoms in order to be able to have full function with decreased symptoms  2. Patient will be able to lift 10lbs over his head with minimal symptoms   3. Patient will report being able to do all household and work related activities with minimal symptoms (0-1/10  4. Patient will score 25 points or less on the DASH order to demonstrate improved function with less pain    Rehabilitation Potential For Stated Goals: Good    Regarding [facility] therapy, I certify that the treatment plan above will be carried out by a therapist or under their direction. Thank you for this referral,  Crystal Valle PT,DPT    Referring Physician Signature: Tian Davidson MD _____________________________________________________ Date: __________________________________          HISTORY:   History of Present Injury/Illness (Reason for Referral): reports that he was at [employer] about 3 wks ago and fell awkwardly on his left arm. He had immediate pain at that point. He had an MRI which revealed a rotator cuff tear and labral tear. Surgery was on April 13, 2018.    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  Current Medications:  None   Date Last Reviewed:  4-20-18   Number of Personal Factors/Comorbidities that affect the Plan of Care: 0: LOW COMPLEXITY   EXAMINATION:   (Abbreviations: P-pain, NP- no pain, wnl-within normal limits)  Observation/Orthostatic Postural Assessment:    Standing resting posture:  · Presents in a sling with abduction pillow  · Increased edema noted around the shoulder  · No sign of drainage or infection    Sitting resting posture:  · -  Palpation/tone/tissue texture:    Soft tissue:  · Upper traps: increased tone on L side    ROM:  (P-pain  NP-no pain)  Cervical ROM  (tested in sitting) Date:  4-20-18       Flexion wnl   Extension wnl   Rotation L wnl   Rotation R wnl     Shoulder ROM Date:  4-20-18       Right Left   Flexion wnl 20 deg   Abduction wnl n/a   IR wnl n/a   ER wnl 10 deg            Strength:   Shoulder strength Date:  4-20-18       Right Left   Flexion 5 n/a   Abduction 5 n/a   IR 5 n/a   ER 5 n/a        Scapular stability Date:  4-20-18       Right Left   Mid trap n/a n/a   Low trap n/a n/a   Rhomboids n/a n/a           Special Tests: -    Neurological Screen:   Myotomes: n/a         Dermatomes: n/a         Reflexes: -         Neural Tension Tests: ULTT (median): -  Functional Mobility:      Balance:-     CLINICAL DECISION MAKING:   Outcome Measure: Tool Used: Disabilities of the Arm, Shoulder and Hand (DASH) Questionnaire - Quick Version  Score:  Initial: 42/55  Most Recent: X/55 (Date: -- )   Interpretation of Score: The DASH is designed to measure the activities of daily living in person's with upper extremity dysfunction or pain. Each section is scored on a 1-5 scale, 5 representing the greatest disability. The scores of each section are added together for a total score of 55. Score 11 12-19 20-28 29-37 38-45 46-54 55   Modifier CH CI CJ CK CL CM CN     Medical Necessity:   · Patient is expected to demonstrate progress in strength, range of motion and symptom levels to return to full function. Reason for Services/Other Comments:  · Patient continues to require skilled intervention due to ongoing symptoms. Use of outcome tool(s) and clinical judgement create a POC that gives a: Clear prediction of patient's progress: LOW COMPLEXITY   TREATMENT:   (In addition to Assessment/Re-Assessment sessions the following treatments were rendered)    Subjective Pre-Treatment Symptoms: reports that he is doing pretty good. States he saw Dr. Nadira Moss who stated that he was doing well and should be ready to remove the sling in 1 wk.     Therapeutic Exercise: (15 min) Done in order to improve strength, ROM and understanding of current condition. Date:  4-20-18 Date  4-27-18 Date  5-4-18   Activity/Exercise Parameters     Education Discussed examination findings, HEP, plan of care  Discussed updated HEP   Cervical and scapular ROM · Rotation bilateral, SB bilateral  · scap retractions, 3 sec hold, 5x · scap retractions 3 sec hold x 10 HEP   Elbow and wrist ROM Elbow extension, flexion,   Discussed    Pendulums  discussed  5x clockwise and counterclockwise   Shoulder shrugs  X 20 -   Shoulder rolls  X 20 HEP   UT stretch  5 sec hold x 10 20 sec, bilateral    Levator scap stretch  5 sec hold x 10 20 sec, bilateral    Sidelying scapular retraction against manual resistance  5 sec hold x 10, arm at side With arm at side and arm in about 60 deg forward elevation supported by therapist, 10 sec holds, 10x   Passive ER and forward elevation   ER 30-45 deg    Forward elevation: 80-90 deg  Using  Dowel in supine           Manual Therapy: (25 minutes) Done in order to improve joint and soft tissue mobility,reduce muscle guarding, and decrease muscle tone  · PROM to R shoulder, ER-30-40 deg   Forward elevation- to about 80 deg      Modalities: (15 min) Done in order to reduce swelling and pain  · Cold pack to L shoulder in supine  Treatment/Session Assessment:  Patient tolerated the session well. He had increased stiffness in his glenohumeral joint with ER but we were still very careful since he is in the healing stages. Discussed for him to start working slowly on ROM in the guidelines described above   · Pain: Initial:    Minimal symptoms at rest Post Session:  Minimal symptoms at rest ·   Compliance with Program/Exercises: Will assess as treatment progresses. · Recommendations/Intent for next treatment session: \"Next visit will focus on progressing the patients plan of care\".     Future Appointments  Date Time Provider Felix Mcgraw   5/11/2018 9:15 AM Ankur Stanford PTJENNIFERT [facility]   5/18/2018 9:15 AM [facility] Gibbs [facility], [facility]     Total Treatment Duration: 55 min  PT Patient Time In/Time Out  Time In: 1010  Time Out: 0170

## 2018-05-11 ENCOUNTER — HOSPITAL ENCOUNTER (OUTPATIENT)
Dept: PHYSICAL THERAPY | Age: 38
Discharge: HOME | End: 2018-05-11
Payer: COMMERCIAL

## 2018-05-11 PROCEDURE — 97140 MANUAL THERAPY 1/> REGIONS: CPT

## 2018-05-11 PROCEDURE — 97110 THERAPEUTIC EXERCISES: CPT

## 2018-05-11 NOTE — PROGRESS NOTES
Griffin Ingram  : 1980  Primary: Ruby Fred Of Nehemias Amaya*  Secondary:  [facility], [address], [place]  [phone]   [phone]         OUTPATIENT PHYSICAL THERAPY:Daily Note 2018    ICD-10: Treatment Diagnosis: COMPLETE ROTATOR CUFF TEAR, NOT SPECIFIED AS TRAUMATIC M75.122;     Precautions/Allergies: sulfa drugs  Fall Risk Score: 1 (? 5 = High Risk)  MD Orders: evaluate and treat MEDICAL/REFERRING DIAGNOSIS:Left shoulder pain [M25.512]  DATE OF ONSET: Date of surgery 2018  REFERRING PHYSICIAN:Baumgarten, Virgie Stank, MD  RETURN PHYSICIAN APPOINTMENT: did not specify      INITIAL ASSESSMENT:  Mr. Vilma Trejo presents to physical therapy with symptoms post rotator cuff and labral repair . The examination is of low complexity due to a stable clinical presentation. Skilled physical therapy is recommended to progress the plan of care in order for the patient to return to full function with minimal symptoms. PROBLEM LIST (Impacting functional limitations):  1. Decreased Strength  2. Decreased ADL/Functional Activities  3. Increased Pain  4. Decreased Activity Tolerance  5. Decreased Flexibility/Joint Mobility  6. Decreased [place] with Home Exercise Program INTERVENTIONS PLANNED:  1. Cold  2. Heat  3. Home Exercise Program (HEP)  4. Manual Therapy  5. Range of Motion (ROM)  6. Therapeutic Exercise/Strengthening     TREATMENT PLAN:  Effective Dates: 18 TO 18. Frequency/Duration: 2 times a week for 8 weeks  GOALS: (Goals have been discussed and agreed upon with patient.)  SHORT-TERM FUNCTIONAL GOALS: Time Frame: 4-8 wks  1. Patient will report 25-50% reduction in overall symptoms  2. Patient will be compliant with HEP and plan of care  3. Patient will have full PROM in the left shoulder as compared to the contralateral side  4.   Patient will improve on the DASH by 8-10 points  DISCHARGE GOALS: Time Frame: 9-12 wk  1. Patient will report % reduction in overall symptoms in order to be able to have full function with decreased symptoms  2. Patient will be able to lift 10lbs over his head with minimal symptoms   3. Patient will report being able to do all household and work related activities with minimal symptoms (0-1/10  4. Patient will score 25 points or less on the DASH order to demonstrate improved function with less pain    Rehabilitation Potential For Stated Goals: Good    Regarding [facility] therapy, I certify that the treatment plan above will be carried out by a therapist or under their direction. Thank you for this referral,  Cherre Krabbe PT,DPT    Referring Physician Signature: Shreyas Wall MD _____________________________________________________ Date: __________________________________          HISTORY:   History of Present Injury/Illness (Reason for Referral): reports that he was at [employer] about 3 wks ago and fell awkwardly on his left arm. He had immediate pain at that point. He had an MRI which revealed a rotator cuff tear and labral tear. Surgery was on April 13, 2018.    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  Current Medications:  None   Date Last Reviewed:  4-20-18   Number of Personal Factors/Comorbidities that affect the Plan of Care: 0: LOW COMPLEXITY   EXAMINATION:   (Abbreviations: P-pain, NP- no pain, wnl-within normal limits)  Observation/Orthostatic Postural Assessment:    Standing resting posture:  · Presents in a sling with abduction pillow  · Increased edema noted around the shoulder  · No sign of drainage or infection    Sitting resting posture:  · -  Palpation/tone/tissue texture:    Soft tissue:  · Upper traps: increased tone on L side    ROM:  (P-pain  NP-no pain)  Cervical ROM  (tested in sitting) Date:  4-20-18       Flexion wnl   Extension wnl   Rotation L wnl   Rotation R wnl     Shoulder ROM Date:  4-20-18       Right Left   Flexion wnl 20 deg   Abduction wnl n/a   IR wnl n/a   ER wnl 10 deg            Strength:   Shoulder strength Date:  4-20-18       Right Left   Flexion 5 n/a   Abduction 5 n/a   IR 5 n/a   ER 5 n/a        Scapular stability Date:  4-20-18       Right Left   Mid trap n/a n/a   Low trap n/a n/a   Rhomboids n/a n/a           Special Tests: -    Neurological Screen:   Myotomes: n/a         Dermatomes: n/a         Reflexes: -         Neural Tension Tests: ULTT (median): -  Functional Mobility:      Balance:-     CLINICAL DECISION MAKING:   Outcome Measure: Tool Used: Disabilities of the Arm, Shoulder and Hand (DASH) Questionnaire - Quick Version  Score:  Initial: 42/55  Most Recent: X/55 (Date: -- )   Interpretation of Score: The DASH is designed to measure the activities of daily living in person's with upper extremity dysfunction or pain. Each section is scored on a 1-5 scale, 5 representing the greatest disability. The scores of each section are added together for a total score of 55. Score 11 12-19 20-28 29-37 38-45 46-54 55   Modifier CH CI CJ CK CL CM CN     Medical Necessity:   · Patient is expected to demonstrate progress in strength, range of motion and symptom levels to return to full function. Reason for Services/Other Comments:  · Patient continues to require skilled intervention due to ongoing symptoms. Use of outcome tool(s) and clinical judgement create a POC that gives a: Clear prediction of patient's progress: LOW COMPLEXITY   TREATMENT:   (In addition to Assessment/Re-Assessment sessions the following treatments were rendered)    Subjective Pre-Treatment Symptoms: reports that his shoulder has felt stiff over the last couple days.      Therapeutic Exercise: (15 min) Done in order to improve strength, ROM and understanding of current condition. Date  4-27-18 Date  5-4-18 Date  5-11-18   Activity/Exercise      Education  Discussed updated HEP Discussed updated HEP   Cervical and scapular ROM · scap retractions 3 sec hold x 10 HEP HEP   Elbow and wrist ROM  Discussed     Pendulums   5x clockwise and counterclockwise 5x clockwise and counterclockwise   Shoulder shrugs X 20 -    Shoulder rolls X 20 HEP 10   UT stretch 5 sec hold x 10 20 sec, bilateral  -   Levator scap stretch 5 sec hold x 10 20 sec, bilateral  -   Sidelying scapular retraction against manual resistance 5 sec hold x 10, arm at side With arm at side and arm in about 60 deg forward elevation supported by therapist, 10 sec holds, 10x -   Passive ER and forward elevation  ER 30-45 deg    Forward elevation: 80-90 deg  Using  Dowel in supine ER: 20-30 deg (had moderate tightness), sustained holds, 5 min  Pulleys: 5 min     Manual Therapy: (30 minutes) Done in order to improve joint and soft tissue mobility,reduce muscle guarding, and decrease muscle tone  · PROM to R shoulder, ER-20-30  Forward elevation- to about 80-90 deg      Modalities: ( min) Done in order to reduce swelling and pain    Treatment/Session Assessment:  His shoulder has become very tight over the last week and he was strongly encouraged to start doing his exercises much more frequently. While the patient was here he also started to feel dizzy with position changes. He was noted to have a + felipe barnes pike test on L side. His BP was 120/78mmHg. We performed the apleys maneuver 2x with some improvement but he still had some symptoms. He was educated to call his parents or spouse to drive him back. He stated that he would go to his car and he felt safe, then he would drive. If not, then he would call his parents. · Pain: Initial:    Minimal symptoms at rest Post Session:  Minimal symptoms at rest ·   Compliance with Program/Exercises: Will assess as treatment progresses.   · Recommendations/Intent for next treatment session: \"Next visit will focus on progressing the patients plan of care\".     Future Appointments  Date Time Provider Felix Mcgraw   5/18/2018 9:15 AM Berny Hassan, PT, DPT [facility]     Total Treatment Duration: 45 min  PT Patient Time In/Time Out  Time In: 5612  Time Out: 6577

## 2018-05-16 ENCOUNTER — HOSPITAL ENCOUNTER (OUTPATIENT)
Dept: PHYSICAL THERAPY | Age: 38
Discharge: HOME | End: 2018-05-16
Payer: COMMERCIAL

## 2018-05-16 PROCEDURE — 97110 THERAPEUTIC EXERCISES: CPT

## 2018-05-16 PROCEDURE — 97140 MANUAL THERAPY 1/> REGIONS: CPT

## 2018-05-16 NOTE — PROGRESS NOTES
Kami Euceda  : 1980  Primary: Oli Moreno Of Nehemias Amaya*  Secondary:  [facility] at [place] [address], [address], [place] [address]  [phone]   [phone]         OUTPATIENT PHYSICAL THERAPY:Daily Note 2018    ICD-10: Treatment Diagnosis: COMPLETE ROTATOR CUFF TEAR, NOT SPECIFIED AS TRAUMATIC M75.122;     Precautions/Allergies: sulfa drugs  Fall Risk Score: 1 (? 5 = High Risk)  MD Orders: evaluate and treat MEDICAL/REFERRING DIAGNOSIS:Left shoulder pain [M25.512]  DATE OF ONSET: Date of surgery 2018  REFERRING PHYSICIAN:Baumgarten, Christinia Spade, MD  RETURN PHYSICIAN APPOINTMENT: did not specify      INITIAL ASSESSMENT:  Mr. Heidi Ty presents to physical therapy with symptoms post rotator cuff and labral repair . The examination is of low complexity due to a stable clinical presentation. Skilled physical therapy is recommended to progress the plan of care in order for the patient to return to full function with minimal symptoms. PROBLEM LIST (Impacting functional limitations):  1. Decreased Strength  2. Decreased ADL/Functional Activities  3. Increased Pain  4. Decreased Activity Tolerance  5. Decreased Flexibility/Joint Mobility  6. Decreased [place] with Home Exercise Program INTERVENTIONS PLANNED:  1. Cold  2. Heat  3. Home Exercise Program (HEP)  4. Manual Therapy  5. Range of Motion (ROM)  6. Therapeutic Exercise/Strengthening     TREATMENT PLAN:  Effective Dates: 18 TO 18. Frequency/Duration: 2 times a week for 8 weeks  GOALS: (Goals have been discussed and agreed upon with patient.)  SHORT-TERM FUNCTIONAL GOALS: Time Frame: 4-8 wks  1. Patient will report 25-50% reduction in overall symptoms  2. Patient will be compliant with HEP and plan of care  3. Patient will have full PROM in the left shoulder as compared to the contralateral side  4.   Patient will improve on the DASH by 8-10 points  DISCHARGE GOALS: Time Frame: 9-12 wk  1. Patient will report % reduction in overall symptoms in order to be able to have full function with decreased symptoms  2. Patient will be able to lift 10lbs over his head with minimal symptoms   3. Patient will report being able to do all household and work related activities with minimal symptoms (0-1/10  4. Patient will score 25 points or less on the DASH order to demonstrate improved function with less pain    Rehabilitation Potential For Stated Goals: Good    Regarding [address] [place] [address] therapy, I certify that the treatment plan above will be carried out by a therapist or under their direction. Thank you for this referral,  Oj Arthur PT,DPT    Referring Physician Signature: Kyree Negron MD _____________________________________________________ Date: __________________________________          HISTORY:   History of Present Injury/Illness (Reason for Referral): reports that he was at [employer] about 3 wks ago and fell awkwardly on his left arm. He had immediate pain at that point. He had an MRI which revealed a rotator cuff tear and labral tear. Surgery was on April 13, 2018.    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  Current Medications:  None   Date Last Reviewed:  4-20-18   Number of Personal Factors/Comorbidities that affect the Plan of Care: 0: LOW COMPLEXITY   EXAMINATION:   (Abbreviations: P-pain, NP- no pain, wnl-within normal limits)  Observation/Orthostatic Postural Assessment:    Standing resting posture:  · Presents in a sling with abduction pillow  · Increased edema noted around the shoulder  · No sign of drainage or infection    Sitting resting posture:  · -  Palpation/tone/tissue texture:    Soft tissue:  · Upper traps: increased tone on L side    ROM:  (P-pain  NP-no pain)  Cervical ROM  (tested in sitting) Date:  4-20-18       Flexion wnl   Extension wnl   Rotation L wnl   Rotation R wnl     Shoulder ROM Date:  4-20-18       Right Left   Flexion wnl 20 deg   Abduction wnl n/a   IR wnl n/a   ER wnl 10 deg            Strength:   Shoulder strength Date:  4-20-18       Right Left   Flexion 5 n/a   Abduction 5 n/a   IR 5 n/a   ER 5 n/a        Scapular stability Date:  4-20-18       Right Left   Mid trap n/a n/a   Low trap n/a n/a   Rhomboids n/a n/a           Special Tests: -    Neurological Screen:   Myotomes: n/a         Dermatomes: n/a         Reflexes: -         Neural Tension Tests: ULTT (median): -  Functional Mobility:      Balance:-     CLINICAL DECISION MAKING:   Outcome Measure: Tool Used: Disabilities of the Arm, Shoulder and Hand (DASH) Questionnaire - Quick Version  Score:  Initial: 42/55  Most Recent: X/55 (Date: -- )   Interpretation of Score: The DASH is designed to measure the activities of daily living in person's with upper extremity dysfunction or pain. Each section is scored on a 1-5 scale, 5 representing the greatest disability. The scores of each section are added together for a total score of 55. Score 11 12-19 20-28 29-37 38-45 46-54 55   Modifier CH CI CJ CK CL CM CN     Medical Necessity:   · Patient is expected to demonstrate progress in strength, range of motion and symptom levels to return to full function. Reason for Services/Other Comments:  · Patient continues to require skilled intervention due to ongoing symptoms. Use of outcome tool(s) and clinical judgement create a POC that gives a: Clear prediction of patient's progress: LOW COMPLEXITY   TREATMENT:   (In addition to Assessment/Re-Assessment sessions the following treatments were rendered)    Subjective Pre-Treatment Symptoms: reports that he is still stiff but working on it. Also reports that the dizziness mostly resolved but still some there with certain neck motions.      Therapeutic Exercise: (15 min) Done in order to improve strength, ROM and understanding of current condition. Date  5-4-18 Date  5-11-18 Date  5-16-18   Activity/Exercise      Education Discussed updated HEP Discussed updated HEP Discussed HEP   Cervical and scapular ROM HEP HEP HEP   Pendulums  5x clockwise and counterclockwise 5x clockwise and counterclockwise HEP   Shoulder rolls HEP 10 -   UT stretch 20 sec, bilateral  - 30 sec   Levator scap stretch 20 sec, bilateral  - -   Sidelying scapular retraction against manual resistance With arm at side and arm in about 60 deg forward elevation supported by therapist, 10 sec holds, 10x - With arm at side and arm in about 60 deg forward elevation supported by therapist, 10 sec holds, 10x   Passive ER and forward elevation ER 30-45 deg    Forward elevation: 80-90 deg  Using  Dowel in supine ER: 20-30 deg (had moderate tightness), sustained holds, 5 min  Pulleys: 5 min Discussed for home     Manual Therapy: (30 minutes) Done in order to improve joint and soft tissue mobility,reduce muscle guarding, and decrease muscle tone  · PROM to R shoulder, ER- 30-45 deg with gentle traction  Forward elevation- to about 80-90 deg with inferior glide (grade III)      Modalities: ( 10 min) Done in order to reduce swelling and pain  Ice x 10 min  Treatment/Session Assessment:  His shoulder mobility was much better today than last week but still has moderate capsular restrictions. Discussed continuing to progress with his HEP. · Pain: Initial:    Minimal symptoms at rest Post Session:  Minimal symptoms at rest ·   Compliance with Program/Exercises: Will assess as treatment progresses. · Recommendations/Intent for next treatment session: \"Next visit will focus on progressing the patients plan of care\".     Future Appointments  Date Time Provider Felix Mcgraw   5/18/2018 9:15 AM Brayan Bishop PT, DPT [facility]   5/21/2018 3:15 PM Brayan Bishop PT, DPT [facility]   5/23/2018 9:15 AM Piedad Thibodeaux, PT, DPT [facility]     Total Treatment Duration: 55 min  PT Patient Time In/Time Out  Time In: 1030  Time Out: 0028

## 2018-05-18 ENCOUNTER — HOSPITAL ENCOUNTER (OUTPATIENT)
Dept: PHYSICAL THERAPY | Age: 38
Discharge: HOME | End: 2018-05-18
Payer: COMMERCIAL

## 2018-05-18 PROCEDURE — 97140 MANUAL THERAPY 1/> REGIONS: CPT

## 2018-05-18 NOTE — PROGRESS NOTES
Neema Aaron  : 1980  Primary: Link Grove Of Nehemias Amaya*  Secondary:  [facility] at [place] [address], [address], [place] [address]  [phone]   [phone]         OUTPATIENT PHYSICAL THERAPY:Daily Note 2018    ICD-10: Treatment Diagnosis: COMPLETE ROTATOR CUFF TEAR, NOT SPECIFIED AS TRAUMATIC M75.122;     Precautions/Allergies: sulfa drugs  Fall Risk Score: 1 (? 5 = High Risk)  MD Orders: evaluate and treat MEDICAL/REFERRING DIAGNOSIS:Left shoulder pain [M25.512]  DATE OF ONSET: Date of surgery 2018  REFERRING PHYSICIAN:Baumgarten, Cassandra Hidden, MD  RETURN PHYSICIAN APPOINTMENT: did not specify      INITIAL ASSESSMENT:  Mr. Martha Antoine presents to physical therapy with symptoms post rotator cuff and labral repair . The examination is of low complexity due to a stable clinical presentation. Skilled physical therapy is recommended to progress the plan of care in order for the patient to return to full function with minimal symptoms. PROBLEM LIST (Impacting functional limitations):  1. Decreased Strength  2. Decreased ADL/Functional Activities  3. Increased Pain  4. Decreased Activity Tolerance  5. Decreased Flexibility/Joint Mobility  6. Decreased [place] with Home Exercise Program INTERVENTIONS PLANNED:  1. Cold  2. Heat  3. Home Exercise Program (HEP)  4. Manual Therapy  5. Range of Motion (ROM)  6. Therapeutic Exercise/Strengthening     TREATMENT PLAN:  Effective Dates: 18 TO 18. Frequency/Duration: 2 times a week for 8 weeks  GOALS: (Goals have been discussed and agreed upon with patient.)  SHORT-TERM FUNCTIONAL GOALS: Time Frame: 4-8 wks  1. Patient will report 25-50% reduction in overall symptoms  2. Patient will be compliant with HEP and plan of care  3. Patient will have full PROM in the left shoulder as compared to the contralateral side  4.   Patient will improve on the DASH by 8-10 points  DISCHARGE GOALS: Time Frame: 9-12 wk  1. Patient will report % reduction in overall symptoms in order to be able to have full function with decreased symptoms  2. Patient will be able to lift 10lbs over his head with minimal symptoms   3. Patient will report being able to do all household and work related activities with minimal symptoms (0-1/10  4. Patient will score 25 points or less on the DASH order to demonstrate improved function with less pain    Rehabilitation Potential For Stated Goals: Good    Regarding [facility] therapy, I certify that the treatment plan above will be carried out by a therapist or under their direction. Thank you for this referral,  Krystle Jensen PT,DPT    Referring Physician Signature: Jairo Alvarado MD _____________________________________________________ Date: __________________________________          HISTORY:   History of Present Injury/Illness (Reason for Referral): reports that he was at [employer] about 3 wks ago and fell awkwardly on his left arm. He had immediate pain at that point. He had an MRI which revealed a rotator cuff tear and labral tear. Surgery was on April 13, 2018.    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  Current Medications:  None   Date Last Reviewed:  4-20-18   Number of Personal Factors/Comorbidities that affect the Plan of Care: 0: LOW COMPLEXITY   EXAMINATION:   (Abbreviations: P-pain, NP- no pain, wnl-within normal limits)  Observation/Orthostatic Postural Assessment:    Standing resting posture:  · Presents in a sling with abduction pillow  · Increased edema noted around the shoulder  · No sign of drainage or infection    Sitting resting posture:  · -  Palpation/tone/tissue texture:    Soft tissue:  · Upper traps: increased tone on L side    ROM:  (P-pain  NP-no pain)  Cervical ROM  (tested in sitting) Date:  4-20-18       Flexion wnl   Extension wnl   Rotation L wnl   Rotation R wnl     Shoulder ROM Date:  4-20-18       Right Left   Flexion wnl 20 deg   Abduction wnl n/a   IR wnl n/a   ER wnl 10 deg            Strength:   Shoulder strength Date:  4-20-18       Right Left   Flexion 5 n/a   Abduction 5 n/a   IR 5 n/a   ER 5 n/a        Scapular stability Date:  4-20-18       Right Left   Mid trap n/a n/a   Low trap n/a n/a   Rhomboids n/a n/a           Special Tests: -    Neurological Screen:   Myotomes: n/a         Dermatomes: n/a         Reflexes: -         Neural Tension Tests: ULTT (median): -  Functional Mobility:      Balance:-     CLINICAL DECISION MAKING:   Outcome Measure: Tool Used: Disabilities of the Arm, Shoulder and Hand (DASH) Questionnaire - Quick Version  Score:  Initial: 42/55  Most Recent: X/55 (Date: -- )   Interpretation of Score: The DASH is designed to measure the activities of daily living in person's with upper extremity dysfunction or pain. Each section is scored on a 1-5 scale, 5 representing the greatest disability. The scores of each section are added together for a total score of 55. Score 11 12-19 20-28 29-37 38-45 46-54 55   Modifier CH CI CJ CK CL CM CN     Medical Necessity:   · Patient is expected to demonstrate progress in strength, range of motion and symptom levels to return to full function. Reason for Services/Other Comments:  · Patient continues to require skilled intervention due to ongoing symptoms. Use of outcome tool(s) and clinical judgement create a POC that gives a: Clear prediction of patient's progress: LOW COMPLEXITY   TREATMENT:   (In addition to Assessment/Re-Assessment sessions the following treatments were rendered)    Subjective Pre-Treatment Symptoms: reports that he is doing pretty good.  States that he didn't have any soreness after the last session      Therapeutic Exercise: (5 min) Done in order to improve strength, ROM and understanding of current condition. Date  5-16-18 Date  5-18-18   Activity/Exercise     Education Discussed HEP Discussed HEP   Pendulums  HEP -   UT stretch 30 sec 30 sec   Sidelying scapular retraction against manual resistance With arm at side and arm in about 60 deg forward elevation supported by therapist, 10 sec holds, 10x HEP   Passive ER and forward elevation Discussed for home HEP   ER  Active assisted with wand, 20x, pain free region     Manual Therapy: (40 minutes) Done in order to improve joint and soft tissue mobility,reduce muscle guarding, and decrease muscle tone  · PROM to R shoulder, ER- to tolerance with gentle traction  Forward elevation and abduction-  with inferior glide (grade III)      Modalities: ( -min) Done in order to reduce swelling and pain  Reported that he didn't need it today  Treatment/Session Assessment:  His shoulder mobility is continuing to improve. He reported that Dr. Marna Koyanagi told him to remove the sling by today during his last apt; therefore, he was instructed to keep it off unless he is an unpredictable environment. Discussed his HEP. · Pain: Initial:    Minimal symptoms at rest Post Session:  Minimal symptoms at rest ·   Compliance with Program/Exercises: Will assess as treatment progresses. · Recommendations/Intent for next treatment session: \"Next visit will focus on progressing the patients plan of care\".     Future Appointments  Date Time Provider Felix Mcgraw   5/21/2018 3:15 PM Chico Khalil, PT, DPT [facility]   5/23/2018 9:15 AM Chico Khalil, PT, DPT [facility]     Total Treatment Duration: 45 min  PT Patient Time In/Time Out  Time In: 0915  Time Out: 1000

## 2018-05-21 ENCOUNTER — HOSPITAL ENCOUNTER (OUTPATIENT)
Dept: PHYSICAL THERAPY | Age: 38
Discharge: HOME | End: 2018-05-21
Payer: COMMERCIAL

## 2018-05-21 PROCEDURE — 97110 THERAPEUTIC EXERCISES: CPT

## 2018-05-21 PROCEDURE — 97140 MANUAL THERAPY 1/> REGIONS: CPT

## 2018-05-21 NOTE — PROGRESS NOTES
Catia Katz  : 1980  Primary: Shawna Jennifer Of Nehemias Amaya*  Secondary:  [facility] [place] [address], [address], [place] [address]  [phone]   [phone]         OUTPATIENT PHYSICAL THERAPY:Daily Note 2018    ICD-10: Treatment Diagnosis: COMPLETE ROTATOR CUFF TEAR, NOT SPECIFIED AS TRAUMATIC M75.122;     Precautions/Allergies: sulfa drugs  Fall Risk Score: 1 (? 5 = High Risk)  MD Orders: evaluate and treat MEDICAL/REFERRING DIAGNOSIS:Left shoulder pain [M25.512]  DATE OF ONSET: Date of surgery 2018  REFERRING PHYSICIAN:Baumgarten, Suzy Sons, MD  RETURN PHYSICIAN APPOINTMENT: did not specify      INITIAL ASSESSMENT:  Mr. Cali Wray presents to physical therapy with symptoms post rotator cuff and labral repair . The examination is of low complexity due to a stable clinical presentation. Skilled physical therapy is recommended to progress the plan of care in order for the patient to return to full function with minimal symptoms. PROBLEM LIST (Impacting functional limitations):  1. Decreased Strength  2. Decreased ADL/Functional Activities  3. Increased Pain  4. Decreased Activity Tolerance  5. Decreased Flexibility/Joint Mobility  6. Decreased [place] with Home Exercise Program INTERVENTIONS PLANNED:  1. Cold  2. Heat  3. Home Exercise Program (HEP)  4. Manual Therapy  5. Range of Motion (ROM)  6. Therapeutic Exercise/Strengthening     TREATMENT PLAN:  Effective Dates: 18 TO 18. Frequency/Duration: 2 times a week for 8 weeks  GOALS: (Goals have been discussed and agreed upon with patient.)  SHORT-TERM FUNCTIONAL GOALS: Time Frame: 4-8 wks  1. Patient will report 25-50% reduction in overall symptoms  2. Patient will be compliant with HEP and plan of care  3. Patient will have full PROM in the left shoulder as compared to the contralateral side  4.   Patient will improve on the DASH by 8-10 points  DISCHARGE GOALS: Time Frame: 9-12 wk  1. Patient will report % reduction in overall symptoms in order to be able to have full function with decreased symptoms  2. Patient will be able to lift 10lbs over his head with minimal symptoms   3. Patient will report being able to do all household and work related activities with minimal symptoms (0-1/10  4. Patient will score 25 points or less on the DASH order to demonstrate improved function with less pain    Rehabilitation Potential For Stated Goals: Good    Regarding [facility] therapy, I certify that the treatment plan above will be carried out by a therapist or under their direction. Thank you for this referral,  Will Cande PT,DPT    Referring Physician Signature: Esperanza Sarabia MD _____________________________________________________ Date: __________________________________          HISTORY:   History of Present Injury/Illness (Reason for Referral): reports that he was at [employer] about 3 wks ago and fell awkwardly on his left arm. He had immediate pain at that point. He had an MRI which revealed a rotator cuff tear and labral tear. Surgery was on April 13, 2018.    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  Current Medications:  None   Date Last Reviewed:  4-20-18   Number of Personal Factors/Comorbidities that affect the Plan of Care: 0: LOW COMPLEXITY   EXAMINATION:   (Abbreviations: P-pain, NP- no pain, wnl-within normal limits)  Observation/Orthostatic Postural Assessment:    Standing resting posture:  · Presents in a sling with abduction pillow  · Increased edema noted around the shoulder  · No sign of drainage or infection    Sitting resting posture:  · -  Palpation/tone/tissue texture:    Soft tissue:  · Upper traps: increased tone on L side    ROM:  (P-pain  NP-no pain)  Cervical ROM  (tested in sitting) Date:  4-20-18       Flexion wnl   Extension wnl   Rotation L wnl   Rotation R wnl     Shoulder ROM Date:  4-20-18       Right Left   Flexion wnl 20 deg   Abduction wnl n/a   IR wnl n/a   ER wnl 10 deg            Strength:   Shoulder strength Date:  4-20-18       Right Left   Flexion 5 n/a   Abduction 5 n/a   IR 5 n/a   ER 5 n/a        Scapular stability Date:  4-20-18       Right Left   Mid trap n/a n/a   Low trap n/a n/a   Rhomboids n/a n/a           Special Tests: -    Neurological Screen:   Myotomes: n/a         Dermatomes: n/a         Reflexes: -         Neural Tension Tests: ULTT (median): -  Functional Mobility:      Balance:-     CLINICAL DECISION MAKING:   Outcome Measure: Tool Used: Disabilities of the Arm, Shoulder and Hand (DASH) Questionnaire - Quick Version  Score:  Initial: 42/55  Most Recent: X/55 (Date: -- )   Interpretation of Score: The DASH is designed to measure the activities of daily living in person's with upper extremity dysfunction or pain. Each section is scored on a 1-5 scale, 5 representing the greatest disability. The scores of each section are added together for a total score of 55. Score 11 12-19 20-28 29-37 38-45 46-54 55   Modifier CH CI CJ CK CL CM CN     Medical Necessity:   · Patient is expected to demonstrate progress in strength, range of motion and symptom levels to return to full function. Reason for Services/Other Comments:  · Patient continues to require skilled intervention due to ongoing symptoms. Use of outcome tool(s) and clinical judgement create a POC that gives a: Clear prediction of patient's progress: LOW COMPLEXITY   TREATMENT:   (In addition to Assessment/Re-Assessment sessions the following treatments were rendered)    Subjective Pre-Treatment Symptoms: reports that he is doing pretty good. States he has been without sling and has been relatively okay.      Therapeutic Exercise: (15 min) Done in order to improve strength, ROM and understanding of current condition. Date  5-18-18 Date  5-21-18   Activity/Exercise     Education Discussed HEP Discussed HEP   Pendulums  -    UT stretch 30 sec 30 sec   Sidelying scapular retraction against manual resistance HEP 10 sec, 10x, done with arm by side and at 70 deg of forward elevation supported on therapist shoulder. Passive ER and forward elevation HEP -   ER Active assisted with wand, 20x, pain free region Isometric, gentle, 5 sec hold, 10x (pain free)     Manual Therapy: (30 minutes) Done in order to improve joint and soft tissue mobility,reduce muscle guarding, and decrease muscle tone  · PROM to R shoulder, ER- to tolerance with gentle traction  Forward elevation and abduction-  with inferior glide (grade III)      Modalities: ( -min) Done in order to reduce swelling and pain  Reported that he didn't need it today  Treatment/Session Assessment:  His shoulder mobility is gradually improving. We were able to add isometrics today that were pain free. Discussed updated HEP. · Pain: Initial:    Minimal symptoms at rest Post Session:  Minimal symptoms at rest ·   Compliance with Program/Exercises: Will assess as treatment progresses. · Recommendations/Intent for next treatment session: \"Next visit will focus on progressing the patients plan of care\".     Future Appointments  Date Time Provider Felix Mcgraw   5/23/2018 9:15 AM Brayan Bishop, PT, DPT [facility]     Total Treatment Duration: 45 min  PT Patient Time In/Time Out  Time In: 1575  Time Out: 1600

## 2018-05-23 ENCOUNTER — HOSPITAL ENCOUNTER (OUTPATIENT)
Dept: PHYSICAL THERAPY | Age: 38
Discharge: HOME | End: 2018-05-23
Payer: COMMERCIAL

## 2018-05-23 PROCEDURE — 97110 THERAPEUTIC EXERCISES: CPT

## 2018-05-23 PROCEDURE — 97140 MANUAL THERAPY 1/> REGIONS: CPT

## 2018-05-23 NOTE — PROGRESS NOTES
Jorge Korina  : 1980  Primary: Deepak Forde Of Nehemias Amaya*  Secondary:  [facility] [place] [address] [place] [address], [address], [place] [address]  [phone]   [phone]         OUTPATIENT PHYSICAL THERAPY:Daily Note 2018    ICD-10: Treatment Diagnosis: COMPLETE ROTATOR CUFF TEAR, NOT SPECIFIED AS TRAUMATIC M75.122;     Precautions/Allergies: sulfa drugs  Fall Risk Score: 1 (? 5 = High Risk)  MD Orders: evaluate and treat MEDICAL/REFERRING DIAGNOSIS:Left shoulder pain [M25.512]  DATE OF ONSET: Date of surgery 2018  REFERRING PHYSICIAN:Baumgarten, Thaddeus Childs, MD  RETURN PHYSICIAN APPOINTMENT: did not specify      INITIAL ASSESSMENT:  Mr. Alessandra Gambino presents to physical therapy with symptoms post rotator cuff and labral repair . The examination is of low complexity due to a stable clinical presentation. Skilled physical therapy is recommended to progress the plan of care in order for the patient to return to full function with minimal symptoms. PROBLEM LIST (Impacting functional limitations):  1. Decreased Strength  2. Decreased ADL/Functional Activities  3. Increased Pain  4. Decreased Activity Tolerance  5. Decreased Flexibility/Joint Mobility  6. Decreased [place] with Home Exercise Program INTERVENTIONS PLANNED:  1. Cold  2. Heat  3. Home Exercise Program (HEP)  4. Manual Therapy  5. Range of Motion (ROM)  6. Therapeutic Exercise/Strengthening     TREATMENT PLAN:  Effective Dates: 18 TO 18. Frequency/Duration: 2 times a week for 8 weeks  GOALS: (Goals have been discussed and agreed upon with patient.)  SHORT-TERM FUNCTIONAL GOALS: Time Frame: 4-8 wks  1. Patient will report 25-50% reduction in overall symptoms  2. Patient will be compliant with HEP and plan of care  3. Patient will have full PROM in the left shoulder as compared to the contralateral side  4.   Patient will improve on the DASH by 8-10 points  DISCHARGE GOALS: Time Frame: 9-12 wk  1. Patient will report % reduction in overall symptoms in order to be able to have full function with decreased symptoms  2. Patient will be able to lift 10lbs over his head with minimal symptoms   3. Patient will report being able to do all household and work related activities with minimal symptoms (0-1/10  4. Patient will score 25 points or less on the DASH order to demonstrate improved function with less pain    Rehabilitation Potential For Stated Goals: Good    Regarding [facility] therapy, I certify that the treatment plan above will be carried out by a therapist or under their direction. Thank you for this referral,  Krystle Jensen PT,DPT    Referring Physician Signature: Jairo Alvarado MD _____________________________________________________ Date: __________________________________          HISTORY:   History of Present Injury/Illness (Reason for Referral): reports that he was at [employer] about 3 wks ago and fell awkwardly on his left arm. He had immediate pain at that point. He had an MRI which revealed a rotator cuff tear and labral tear. Surgery was on April 13, 2018.    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  Current Medications:  None   Date Last Reviewed:  4-20-18   Number of Personal Factors/Comorbidities that affect the Plan of Care: 0: LOW COMPLEXITY   EXAMINATION:   (Abbreviations: P-pain, NP- no pain, wnl-within normal limits)  Observation/Orthostatic Postural Assessment:    Standing resting posture:  · Presents in a sling with abduction pillow  · Increased edema noted around the shoulder  · No sign of drainage or infection    Sitting resting posture:  · -  Palpation/tone/tissue texture:    Soft tissue:  · Upper traps: increased tone on L side    ROM:  (P-pain  NP-no pain)  Cervical ROM  (tested in sitting) Date:  4-20-18       Flexion wnl   Extension wnl   Rotation L wnl   Rotation R wnl     Shoulder ROM Date:  4-20-18       Right Left   Flexion wnl 20 deg   Abduction wnl n/a   IR wnl n/a   ER wnl 10 deg            Strength:   Shoulder strength Date:  4-20-18       Right Left   Flexion 5 n/a   Abduction 5 n/a   IR 5 n/a   ER 5 n/a        Scapular stability Date:  4-20-18       Right Left   Mid trap n/a n/a   Low trap n/a n/a   Rhomboids n/a n/a           Special Tests: -    Neurological Screen:   Myotomes: n/a         Dermatomes: n/a         Reflexes: -         Neural Tension Tests: ULTT (median): -  Functional Mobility:      Balance:-     CLINICAL DECISION MAKING:   Outcome Measure: Tool Used: Disabilities of the Arm, Shoulder and Hand (DASH) Questionnaire - Quick Version  Score:  Initial: 42/55  Most Recent: X/55 (Date: -- )   Interpretation of Score: The DASH is designed to measure the activities of daily living in person's with upper extremity dysfunction or pain. Each section is scored on a 1-5 scale, 5 representing the greatest disability. The scores of each section are added together for a total score of 55. Score 11 12-19 20-28 29-37 38-45 46-54 55   Modifier CH CI CJ CK CL CM CN     Medical Necessity:   · Patient is expected to demonstrate progress in strength, range of motion and symptom levels to return to full function. Reason for Services/Other Comments:  · Patient continues to require skilled intervention due to ongoing symptoms. Use of outcome tool(s) and clinical judgement create a POC that gives a: Clear prediction of patient's progress: LOW COMPLEXITY   TREATMENT:   (In addition to Assessment/Re-Assessment sessions the following treatments were rendered)    Subjective Pre-Treatment Symptoms: reports that he is doing pretty good. Minimal soreness but still feels stiff.      Therapeutic Exercise: (20 min) Done in order to improve strength, ROM and understanding of current condition. Date  5-18-18 Date  5-21-18 Date  5-23-18   Activity/Exercise      Education Discussed HEP Discussed HEP Discussed HEP   UBE   10 min, no resistance    Pendulums  -  -   UT stretch 30 sec 30 sec -   Sidelying scapular retraction against manual resistance HEP 10 sec, 10x, done with arm by side and at 70 deg of forward elevation supported on therapist shoulder. 10 sec, 10x, done with arm by side and at 70 deg of forward elevation supported on therapist shoulder. Passive ER and forward elevation HEP - HEP   ER Active assisted with wand, 20x, pain free region Isometric, gentle, 5 sec hold, 10x (pain free) At 0 deg, 20 & 35 deg ER, 5 sec hold, 5x for each     Manual Therapy: (30 minutes) Done in order to improve joint and soft tissue mobility,reduce muscle guarding, and decrease muscle tone  · PROM to R shoulder, ER- to tolerance with gentle traction  Forward elevation and abduction-  with inferior glide (grade III)      Modalities: ( -min) Done in order to reduce swelling and pain  -  Treatment/Session Assessment:   Motion is gradually improving but external rotation is still moderately limited. Continuing to work on ROM. Instructed to do isometrics at various angles of ER     · Pain: Initial:    Minimal symptoms at rest Post Session:  Minimal symptoms at rest ·   Compliance with Program/Exercises: Will assess as treatment progresses. · Recommendations/Intent for next treatment session: \"Next visit will focus on progressing the patients plan of care\".     Future Appointments  Date Time Provider Felix Mcgraw   5/30/2018 3:00 PM Elsie Swann PT, DPT [facility]   6/1/2018 1:45 PM Elsie Swann PT, DPSARAH [facility]   6/6/2018 10:45 AM Elsie Swann PT, DPT [facility]   6/8/2018 9:15 AM Kailyn Kaur PT, DPT [facility]   6/11/2018 9:15 AM Kailyn Kaur PT, DPSARAH [facility]   6/13/2018 9:15 AM Kailyn Kaur PT, DPT [facility] 6/18/2018 9:15 AM [facility]   6/20/2018 9:15 AM [facility] PT[facility]     Total Treatment Duration: 50 min  PT Patient Time In/Time Out  Time In: 0915  Time Out: 4719

## 2018-05-30 ENCOUNTER — HOSPITAL ENCOUNTER (OUTPATIENT)
Dept: PHYSICAL THERAPY | Age: 38
Discharge: HOME | End: 2018-05-30
Payer: COMMERCIAL

## 2018-05-30 PROCEDURE — 97140 MANUAL THERAPY 1/> REGIONS: CPT

## 2018-05-30 PROCEDURE — 97110 THERAPEUTIC EXERCISES: CPT

## 2018-05-30 NOTE — PROGRESS NOTES
Saltyjessicaia Virginia  : 1980  Primary: Shy Li Of Nehemias Amaya*  Secondary:  [facility] [place] [address], [address], [place] [address]  [phone]   [phone]         OUTPATIENT PHYSICAL THERAPY:Daily Note 2018    ICD-10: Treatment Diagnosis: COMPLETE ROTATOR CUFF TEAR, NOT SPECIFIED AS TRAUMATIC M75.122;     Precautions/Allergies: sulfa drugs  Fall Risk Score: 1 (? 5 = High Risk)  MD Orders: evaluate and treat MEDICAL/REFERRING DIAGNOSIS:Left shoulder pain [M25.512]  DATE OF ONSET: Date of surgery 2018  REFERRING PHYSICIAN:Baumgarten, Teri Rome, MD  RETURN PHYSICIAN APPOINTMENT: did not specify      INITIAL ASSESSMENT:  Mr. Amber Couch presents to physical therapy with symptoms post rotator cuff and labral repair . The examination is of low complexity due to a stable clinical presentation. Skilled physical therapy is recommended to progress the plan of care in order for the patient to return to full function with minimal symptoms. PROBLEM LIST (Impacting functional limitations):  1. Decreased Strength  2. Decreased ADL/Functional Activities  3. Increased Pain  4. Decreased Activity Tolerance  5. Decreased Flexibility/Joint Mobility  6. Decreased [place] with Home Exercise Program INTERVENTIONS PLANNED:  1. Cold  2. Heat  3. Home Exercise Program (HEP)  4. Manual Therapy  5. Range of Motion (ROM)  6. Therapeutic Exercise/Strengthening     TREATMENT PLAN:  Effective Dates: 18 TO 18. Frequency/Duration: 2 times a week for 8 weeks  GOALS: (Goals have been discussed and agreed upon with patient.)  SHORT-TERM FUNCTIONAL GOALS: Time Frame: 4-8 wks  1. Patient will report 25-50% reduction in overall symptoms  2. Patient will be compliant with HEP and plan of care  3. Patient will have full PROM in the left shoulder as compared to the contralateral side  4.   Patient will improve on the DASH by 8-10 points  DISCHARGE GOALS: Time Frame: 9-12 wk  1. Patient will report % reduction in overall symptoms in order to be able to have full function with decreased symptoms  2. Patient will be able to lift 10lbs over his head with minimal symptoms   3. Patient will report being able to do all household and work related activities with minimal symptoms (0-1/10  4. Patient will score 25 points or less on the DASH order to demonstrate improved function with less pain    Rehabilitation Potential For Stated Goals: Good    Regarding [facility] therapy, I certify that the treatment plan above will be carried out by a therapist or under their direction. Thank you for this referral,  Maranda Akins PT,DPT    Referring Physician Signature: Josi Velazco MD _____________________________________________________ Date: __________________________________          HISTORY:   History of Present Injury/Illness (Reason for Referral): reports that he was at [employer] about 3 wks ago and fell awkwardly on his left arm. He had immediate pain at that point. He had an MRI which revealed a rotator cuff tear and labral tear. Surgery was on April 13, 2018.    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  Current Medications:  None   Date Last Reviewed:  4-20-18   Number of Personal Factors/Comorbidities that affect the Plan of Care: 0: LOW COMPLEXITY   EXAMINATION:   (Abbreviations: P-pain, NP- no pain, wnl-within normal limits)  Observation/Orthostatic Postural Assessment:    Standing resting posture:  · Presents in a sling with abduction pillow  · Increased edema noted around the shoulder  · No sign of drainage or infection    Sitting resting posture:  · -  Palpation/tone/tissue texture:    Soft tissue:  · Upper traps: increased tone on L side    ROM:  (P-pain  NP-no pain)  Cervical ROM  (tested in sitting) Date:  4-20-18       Flexion wnl   Extension wnl   Rotation L wnl   Rotation R wnl     Shoulder ROM Date:  4-20-18       Right Left   Flexion wnl 20 deg   Abduction wnl n/a   IR wnl n/a   ER wnl 10 deg            Strength:   Shoulder strength Date:  4-20-18       Right Left   Flexion 5 n/a   Abduction 5 n/a   IR 5 n/a   ER 5 n/a        Scapular stability Date:  4-20-18       Right Left   Mid trap n/a n/a   Low trap n/a n/a   Rhomboids n/a n/a           Special Tests: -    Neurological Screen:   Myotomes: n/a         Dermatomes: n/a         Reflexes: -         Neural Tension Tests: ULTT (median): -  Functional Mobility:      Balance:-     CLINICAL DECISION MAKING:   Outcome Measure: Tool Used: Disabilities of the Arm, Shoulder and Hand (DASH) Questionnaire - Quick Version  Score:  Initial: 42/55  Most Recent: X/55 (Date: -- )   Interpretation of Score: The DASH is designed to measure the activities of daily living in person's with upper extremity dysfunction or pain. Each section is scored on a 1-5 scale, 5 representing the greatest disability. The scores of each section are added together for a total score of 55. Score 11 12-19 20-28 29-37 38-45 46-54 55   Modifier CH CI CJ CK CL CM CN     Medical Necessity:   · Patient is expected to demonstrate progress in strength, range of motion and symptom levels to return to full function. Reason for Services/Other Comments:  · Patient continues to require skilled intervention due to ongoing symptoms. Use of outcome tool(s) and clinical judgement create a POC that gives a: Clear prediction of patient's progress: LOW COMPLEXITY   TREATMENT:   (In addition to Assessment/Re-Assessment sessions the following treatments were rendered)    Subjective Pre-Treatment Symptoms: reports that he feels that he has plateued this week, is feeling a bit frustrated with that. But pain levels remain low, mostly very stiff.      Therapeutic Exercise: (20 min) Done in order to improve strength, ROM and understanding of current condition. Date  5-18-18 Date  5-21-18 Date  5-23-18 5/30   Activity/Exercise       Education Discussed HEP Discussed HEP Discussed HEP Discussed HEP   UBE   10 min, no resistance  4/4, no resistance   Pendulums  -  -    UT stretch 30 sec 30 sec -    Sidelying scapular retraction against manual resistance HEP 10 sec, 10x, done with arm by side and at 70 deg of forward elevation supported on therapist shoulder. 10 sec, 10x, done with arm by side and at 70 deg of forward elevation supported on therapist shoulder. 10 sec, 10x, done with arm by side and at 70 deg of forward elevation supported on therapist shoulder. Passive ER and forward elevation HEP - HEP    ER isometric Active assisted with wand, 20x, pain free region Isometric, gentle, 5 sec hold, 10x (pain free) At 0 deg, 20 & 35 deg ER, 5 sec hold, 5x for each Isometric, gentle, at 0 deg, 20 deg, 35 deg, 5 sec hold x 5 ea   ER AROM    Sidelying 2 x 10   Supine shoulder flexion    2 x 10                   Manual Therapy: (25 minutes) Done in order to improve joint and soft tissue mobility,reduce muscle guarding, and decrease muscle tone  · PROM to R shoulder, ER- to tolerance with gentle traction  Forward elevation and abduction-  with inferior glide (grade III)      Modalities: ( -min) Done in order to reduce swelling and pain  -  Treatment/Session Assessment:   Motion improved after manual therapy. AROM done to tolerance within available motion. Limited ER motion. · Pain: Initial:    Minimal symptoms at rest Post Session:  Minimal symptoms at rest ·   Compliance with Program/Exercises: Will assess as treatment progresses. · Recommendations/Intent for next treatment session: \"Next visit will focus on progressing the patients plan of care\".     Future Appointments  Date Time Provider Felix Mcgraw   6/1/2018 1:45 PM Queen Nikhil, PT, DPT [facility]   6/6/2018 10:45 AM [facility] Stark [facility]   6/8/2018 9:15 AM [facility] Jacobsen [facility]   6/11/2018 9:15 AM Mohini Jacobsen PT, MARK [facility]   6/13/2018 9:15 AM [facility]   6/18/2018 9:15 AM [facility]ini Jacobsen PT, [facility]AH [facility]   6/20/2018 9:15 AM Mohini Jacobsen PT, [facility]     Total Treatment Duration: 50 min  PT Patient Time In/Time Out  Time In: 7745  Time Out: 1600

## 2018-06-01 ENCOUNTER — HOSPITAL ENCOUNTER (OUTPATIENT)
Dept: PHYSICAL THERAPY | Age: 38
Discharge: HOME | End: 2018-06-01
Payer: COMMERCIAL

## 2018-06-01 PROCEDURE — 97016 VASOPNEUMATIC DEVICE THERAPY: CPT

## 2018-06-01 PROCEDURE — 97140 MANUAL THERAPY 1/> REGIONS: CPT

## 2018-06-01 PROCEDURE — 97110 THERAPEUTIC EXERCISES: CPT

## 2018-06-01 NOTE — PROGRESS NOTES
Melvin Pedroza  : 1980  Primary: [facility]*  Secondary:  [facility] [place] [address], [address], [place] [address]  [phone]   [phone]         OUTPATIENT PHYSICAL THERAPY:Daily Note 2018    ICD-10: Treatment Diagnosis: COMPLETE ROTATOR CUFF TEAR, NOT SPECIFIED AS TRAUMATIC M75.122;     Precautions/Allergies: sulfa drugs  Fall Risk Score: 1 (? 5 = High Risk)  MD Orders: evaluate and treat MEDICAL/REFERRING DIAGNOSIS:Left shoulder pain [M25.512]  DATE OF ONSET: Date of surgery 2018  REFERRING PHYSICIAN:Baumgarten, Sherian Baston, MD  RETURN PHYSICIAN APPOINTMENT: did not specify      INITIAL ASSESSMENT:  Mr. Cj Yousif presents to physical therapy with symptoms post rotator cuff and labral repair . The examination is of low complexity due to a stable clinical presentation. Skilled physical therapy is recommended to progress the plan of care in order for the patient to return to full function with minimal symptoms. PROBLEM LIST (Impacting functional limitations):  1. Decreased Strength  2. Decreased ADL/Functional Activities  3. Increased Pain  4. Decreased Activity Tolerance  5. Decreased Flexibility/Joint Mobility  6. Decreased [place] with Home Exercise Program INTERVENTIONS PLANNED:  1. Cold  2. Heat  3. Home Exercise Program (HEP)  4. Manual Therapy  5. Range of Motion (ROM)  6. Therapeutic Exercise/Strengthening     TREATMENT PLAN:  Effective Dates: 18 TO 18. Frequency/Duration: 2 times a week for 8 weeks  GOALS: (Goals have been discussed and agreed upon with patient.)  SHORT-TERM FUNCTIONAL GOALS: Time Frame: 4-8 wks  1. Patient will report 25-50% reduction in overall symptoms  2. Patient will be compliant with HEP and plan of care  3. Patient will have full PROM in the left shoulder as compared to the contralateral side  4.   Patient will improve on the DASH by 8-10 points  DISCHARGE GOALS: Time Frame: 9-12 wk  1. Patient will report % reduction in overall symptoms in order to be able to have full function with decreased symptoms  2. Patient will be able to lift 10lbs over his head with minimal symptoms   3. Patient will report being able to do all household and work related activities with minimal symptoms (0-1/10  4. Patient will score 25 points or less on the DASH order to demonstrate improved function with less pain    Rehabilitation Potential For Stated Goals: Good    Regarding [facility] therapy, I certify that the treatment plan above will be carried out by a therapist or under their direction. Thank you for this referral,  Lars Galvan PT,DPT    Referring Physician Signature: Reji De La Paz MD _____________________________________________________ Date: __________________________________          HISTORY:   History of Present Injury/Illness (Reason for Referral): reports that he was at [employer] about 3 wks ago and fell awkwardly on his left arm. He had immediate pain at that point. He had an MRI which revealed a rotator cuff tear and labral tear. Surgery was on April 13, 2018.    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  Current Medications:  None   Date Last Reviewed:  4-20-18   Number of Personal Factors/Comorbidities that affect the Plan of Care: 0: LOW COMPLEXITY   EXAMINATION:   (Abbreviations: P-pain, NP- no pain, wnl-within normal limits)  Observation/Orthostatic Postural Assessment:    Standing resting posture:  · Presents in a sling with abduction pillow  · Increased edema noted around the shoulder  · No sign of drainage or infection    Sitting resting posture:  · -  Palpation/tone/tissue texture:    Soft tissue:  · Upper traps: increased tone on L side    ROM:  (P-pain  NP-no pain)  Cervical ROM  (tested in sitting) Date:  4-20-18       Flexion wnl   Extension wnl   Rotation L wnl   Rotation R wnl     Shoulder ROM Date:  4-20-18       Right Left   Flexion wnl 20 deg   Abduction wnl n/a   IR wnl n/a   ER wnl 10 deg            Strength:   Shoulder strength Date:  4-20-18       Right Left   Flexion 5 n/a   Abduction 5 n/a   IR 5 n/a   ER 5 n/a        Scapular stability Date:  4-20-18       Right Left   Mid trap n/a n/a   Low trap n/a n/a   Rhomboids n/a n/a           Special Tests: -    Neurological Screen:   Myotomes: n/a         Dermatomes: n/a         Reflexes: -         Neural Tension Tests: ULTT (median): -  Functional Mobility:      Balance:-     CLINICAL DECISION MAKING:   Outcome Measure: Tool Used: Disabilities of the Arm, Shoulder and Hand (DASH) Questionnaire - Quick Version  Score:  Initial: 42/55  Most Recent: X/55 (Date: -- )   Interpretation of Score: The DASH is designed to measure the activities of daily living in person's with upper extremity dysfunction or pain. Each section is scored on a 1-5 scale, 5 representing the greatest disability. The scores of each section are added together for a total score of 55. Score 11 12-19 20-28 29-37 38-45 46-54 55   Modifier CH CI CJ CK CL CM CN     Medical Necessity:   · Patient is expected to demonstrate progress in strength, range of motion and symptom levels to return to full function. Reason for Services/Other Comments:  · Patient continues to require skilled intervention due to ongoing symptoms. Use of outcome tool(s) and clinical judgement create a POC that gives a: Clear prediction of patient's progress: LOW COMPLEXITY   TREATMENT:   (In addition to Assessment/Re-Assessment sessions the following treatments were rendered)    Subjective Pre-Treatment Symptoms: reports that he feels he has had better movement since last treatment.       Therapeutic Exercise: (20 min) Done in order to improve strength, ROM and understanding of current condition. Date  5-21-18 Date  5-23-18 5/30 6/1   Activity/Exercise       Education Discussed HEP Discussed HEP Discussed HEP    UBE  10 min, no resistance  4/4, no resistance 4/4, level 1.5   Pendulums   -     UT stretch 30 sec -     Sidelying scapular retraction against manual resistance 10 sec, 10x, done with arm by side and at 70 deg of forward elevation supported on therapist shoulder. 10 sec, 10x, done with arm by side and at 70 deg of forward elevation supported on therapist shoulder. 10 sec, 10x, done with arm by side and at 70 deg of forward elevation supported on therapist shoulder. Passive ER and forward elevation - HEP     ER isometric Isometric, gentle, 5 sec hold, 10x (pain free) At 0 deg, 20 & 35 deg ER, 5 sec hold, 5x for each Isometric, gentle, at 0 deg, 20 deg, 35 deg, 5 sec hold x 5 ea Isometric, gentle, at 0 deg, 20 deg, 35 deg, 5 sec hold x 5 ea   ER AROM   Sidelying 2 x 10 Sidelying 2 x 10   Supine shoulder flexion   2 x 10 2 x 10   Standing wand flexion    X 20            Manual Therapy: (25 minutes) Done in order to improve joint and soft tissue mobility,reduce muscle guarding, and decrease muscle tone  · PROM to R shoulder, ER- to tolerance and with gentle traction  Forward elevation and abduction-  with inferior glide (grade III)      Modalities: ( 10 min) Done in order to reduce swelling and pain  - [employer] to L shoulder, medium compression     Treatment/Session Assessment:   Pt demonstrated improved ER and flexion PROM compared to last tx. Motion improved after manual therapy. AROM done to tolerance within available motion. Limited ER motion. · Pain: Initial:    Minimal symptoms at rest Post Session:  Minimal symptoms at rest ·   Compliance with Program/Exercises: Will assess as treatment progresses. · Recommendations/Intent for next treatment session: \"Next visit will focus on progressing the patients plan of care\".     Future Appointments  Date Time Provider Department [place]   6/6/2018 10:45 AM Tyra Hawk PT, DPT [facility]   6/8/2018 9:15 AM Debbie Raya PT, DPT [facility]   6/11/2018 9:15 AM Debbie Raya PT, DPT [facility]   6/13/2018 9:15 AM Debbie Raya PT, DPT [facility]   6/18/2018 9:15 AM Debbie Raya PT, DPT [facility]   6/20/2018 9:15 AM Debbie Raya PT, DPT [facility]     Total Treatment Duration: 60 min  PT Patient Time In/Time Out  Time In: 9703  Time Out: 7491

## 2018-06-06 ENCOUNTER — HOSPITAL ENCOUNTER (OUTPATIENT)
Dept: PHYSICAL THERAPY | Age: 38
Discharge: HOME | End: 2018-06-06
Payer: COMMERCIAL

## 2018-06-06 PROCEDURE — 97140 MANUAL THERAPY 1/> REGIONS: CPT

## 2018-06-06 PROCEDURE — 97110 THERAPEUTIC EXERCISES: CPT

## 2018-06-06 NOTE — PROGRESS NOTES
Adia Eye  : 1980  Primary: [facility]*  Secondary:  [facility] [address], [place] [address]  [phone]   [phone]         OUTPATIENT PHYSICAL THERAPY:Daily Note 2018    ICD-10: Treatment Diagnosis: COMPLETE ROTATOR CUFF TEAR, NOT SPECIFIED AS TRAUMATIC M75.122;     Precautions/Allergies: sulfa drugs  Fall Risk Score: 1 (? 5 = High Risk)  MD Orders: evaluate and treat MEDICAL/REFERRING DIAGNOSIS:Left shoulder pain [M25.512]  DATE OF ONSET: Date of surgery 2018  REFERRING PHYSICIAN:Baumgarten, Wilhemenia Glenn, MD  RETURN PHYSICIAN APPOINTMENT: did not specify      INITIAL ASSESSMENT:  Mr. Lexi Sales presents to physical therapy with symptoms post rotator cuff and labral repair . The examination is of low complexity due to a stable clinical presentation. Skilled physical therapy is recommended to progress the plan of care in order for the patient to return to full function with minimal symptoms. PROBLEM LIST (Impacting functional limitations):  1. Decreased Strength  2. Decreased ADL/Functional Activities  3. Increased Pain  4. Decreased Activity Tolerance  5. Decreased Flexibility/Joint Mobility  6. Decreased [place] with Home Exercise Program INTERVENTIONS PLANNED:  1. Cold  2. Heat  3. Home Exercise Program (HEP)  4. Manual Therapy  5. Range of Motion (ROM)  6. Therapeutic Exercise/Strengthening     TREATMENT PLAN:  Effective Dates: 18 TO 18. Frequency/Duration: 2 times a week for 8 weeks  GOALS: (Goals have been discussed and agreed upon with patient.)  SHORT-TERM FUNCTIONAL GOALS: Time Frame: 4-8 wks  1. Patient will report 25-50% reduction in overall symptoms  2. Patient will be compliant with HEP and plan of care  3. Patient will have full PROM in the left shoulder as compared to the contralateral side  4.   Patient will improve on the DASH by 8-10 points  DISCHARGE GOALS: Time Frame: 9-12 wk  1. Patient will report % reduction in overall symptoms in order to be able to have full function with decreased symptoms  2. Patient will be able to lift 10lbs over his head with minimal symptoms   3. Patient will report being able to do all household and work related activities with minimal symptoms (0-1/10  4. Patient will score 25 points or less on the DASH order to demonstrate improved function with less pain    Rehabilitation Potential For Stated Goals: Good    Regarding [facility] therapy, I certify that the treatment plan above will be carried out by a therapist or under their direction. Thank you for this referral,  Jason Montes PT,DPT    Referring Physician Signature: Caterina Oropeza MD _____________________________________________________ Date: __________________________________          HISTORY:   History of Present Injury/Illness (Reason for Referral): reports that he was at [employer] about 3 wks ago and fell awkwardly on his left arm. He had immediate pain at that point. He had an MRI which revealed a rotator cuff tear and labral tear. Surgery was on April 13, 2018.    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  Current Medications:  None   Date Last Reviewed:  4-20-18   Number of Personal Factors/Comorbidities that affect the Plan of Care: 0: LOW COMPLEXITY   EXAMINATION:   (Abbreviations: P-pain, NP- no pain, wnl-within normal limits)  Observation/Orthostatic Postural Assessment:    Standing resting posture:  · Presents in a sling with abduction pillow  · Increased edema noted around the shoulder  · No sign of drainage or infection    Sitting resting posture:  · -  Palpation/tone/tissue texture:    Soft tissue:  · Upper traps: increased tone on L side    ROM:  (P-pain  NP-no pain)  Cervical ROM  (tested in sitting) Date:  4-20-18       Flexion wnl   Extension wnl   Rotation L wnl   Rotation R wnl     Shoulder ROM Date:  4-20-18       Right Left   Flexion wnl 20 deg   Abduction wnl n/a   IR wnl n/a   ER wnl 10 deg            Strength:   Shoulder strength Date:  4-20-18       Right Left   Flexion 5 n/a   Abduction 5 n/a   IR 5 n/a   ER 5 n/a        Scapular stability Date:  4-20-18       Right Left   Mid trap n/a n/a   Low trap n/a n/a   Rhomboids n/a n/a           Special Tests: -    Neurological Screen:   Myotomes: n/a         Dermatomes: n/a         Reflexes: -         Neural Tension Tests: ULTT (median): -  Functional Mobility:      Balance:-     CLINICAL DECISION MAKING:   Outcome Measure: Tool Used: Disabilities of the Arm, Shoulder and Hand (DASH) Questionnaire - Quick Version  Score:  Initial: 42/55  Most Recent: X/55 (Date: -- )   Interpretation of Score: The DASH is designed to measure the activities of daily living in person's with upper extremity dysfunction or pain. Each section is scored on a 1-5 scale, 5 representing the greatest disability. The scores of each section are added together for a total score of 55. Score 11 12-19 20-28 29-37 38-45 46-54 55   Modifier CH CI CJ CK CL CM CN     Medical Necessity:   · Patient is expected to demonstrate progress in strength, range of motion and symptom levels to return to full function. Reason for Services/Other Comments:  · Patient continues to require skilled intervention due to ongoing symptoms. Use of outcome tool(s) and clinical judgement create a POC that gives a: Clear prediction of patient's progress: LOW COMPLEXITY   TREATMENT:   (In addition to Assessment/Re-Assessment sessions the following treatments were rendered)    Subjective Pre-Treatment Symptoms: reports his shoulder is starting to feel good and move better. Therapeutic Exercise: (20 min) Done in order to improve strength, ROM and understanding of current condition. Date  5-23-18 5/30 6/1 6/6   Activity/Exercise       Education Discussed HEP Discussed HEP     UBE 10 min, no resistance  4/4, no resistance 4/4, level 1.5 4/4 level 2   Pendulums  -      UT stretch -      Sidelying scapular retraction against manual resistance 10 sec, 10x, done with arm by side and at 70 deg of forward elevation supported on therapist shoulder. 10 sec, 10x, done with arm by side and at 70 deg of forward elevation supported on therapist shoulder. Passive ER and forward elevation HEP      ER isometric At 0 deg, 20 & 35 deg ER, 5 sec hold, 5x for each Isometric, gentle, at 0 deg, 20 deg, 35 deg, 5 sec hold x 5 ea Isometric, gentle, at 0 deg, 20 deg, 35 deg, 5 sec hold x 5 ea Isometric, gentle, at 0 deg, 20 deg, 35 deg, 5 sec hold x 5 ea   ER AROM  Sidelying 2 x 10 Sidelying 2 x 10 Sidelying 2 x 10   Supine shoulder flexion  2 x 10 2 x 10 2 x 10   Standing wand flexion   X 20    Wall slide into flexion    X 20   Wall slide in circles    X 10 ea direction     Manual Therapy: (25 minutes) Done in order to improve joint and soft tissue mobility,reduce muscle guarding, and decrease muscle tone  · PROM to R shoulder, ER- to tolerance and with gentle traction  Forward elevation and abduction-  with inferior glide (grade III)      Modalities: (  min) Done in order to reduce swelling and pain      Treatment/Session Assessment:   Pt continues to demonstrate improvements in ROM. Declined modalities today. · Pain: Initial:    Minimal symptoms at rest Post Session:  Minimal symptoms at rest ·   Compliance with Program/Exercises: Will assess as treatment progresses. · Recommendations/Intent for next treatment session: \"Next visit will focus on progressing the patients plan of care\".     Future Appointments  Date Time Provider [place]   6/8/2018 9:15 AM Esdras Mercado PT, DPT [facility]   6/11/2018 9:15 AM Esdras Mercado PT, DPT [facility]   6/13/2018 9:15 AM Carol Coron[facility]zew[facility]   2018 9:15 AM Luellen Apgar, PT, DPT [facility]   2018 9:15 AM Luellen Apgar, PT, DPT [facility]     Total Treatment Duration: 60 min  PT Patient Time In/Time Out  Time In: 1050  Time Out:

## 2018-06-08 ENCOUNTER — HOSPITAL ENCOUNTER (OUTPATIENT)
Dept: PHYSICAL THERAPY | Age: 38
Discharge: HOME | End: 2018-06-08
Payer: COMMERCIAL

## 2018-06-08 PROCEDURE — 97140 MANUAL THERAPY 1/> REGIONS: CPT

## 2018-06-08 PROCEDURE — 97110 THERAPEUTIC EXERCISES: CPT

## 2018-06-08 NOTE — THERAPY RECERTIFICATION
Angelika Edwards  : 1980  Primary: [facility]  Secondary:  [facility] at [place] [address], [address], [place] [address]  [phone]   [phone]         OUTPATIENT PHYSICAL THERAPY:Daily Note and Re-evaluation 2018    ICD-10: Treatment Diagnosis: COMPLETE ROTATOR CUFF TEAR, NOT SPECIFIED AS TRAUMATIC M75.122;     Precautions/Allergies: sulfa drugs  Fall Risk Score: 1 (? 5 = High Risk)  MD Orders: evaluate and treat MEDICAL/REFERRING DIAGNOSIS:Left shoulder pain [M25.512]  DATE OF ONSET: Date of surgery 2018  REFERRING PHYSICIAN:Baumgarten, Garrie Hallmark, MD  RETURN PHYSICIAN APPOINTMENT: did not specify       ASSESSMENT:  Mr. Sue Corona has come for a total of 12 visits since starting physical therapy on 18. During that time his symptoms are gradually improving but he still has moderate ROM restrictions in all planes. He was educated to become more aggressive and compliant with this at home. Skilled physical therapy is recommended to continue progressing his plan of care in order to return him to full function with minimal symptoms. PROBLEM LIST (Impacting functional limitations):  1. Decreased Strength  2. Decreased ADL/Functional Activities  3. Increased Pain  4. Decreased Activity Tolerance  5. Decreased Flexibility/Joint Mobility  6. Decreased [place] with Home Exercise Program INTERVENTIONS PLANNED:  1. Cold  2. Heat  3. Home Exercise Program (HEP)  4. Manual Therapy  5. Range of Motion (ROM)  6. Therapeutic Exercise/Strengthening     TREATMENT PLAN:  Effective Dates: 18 TO 18    Frequency/Duration: 2 times a week for 8 weeks  GOALS: (Goals have been discussed and agreed upon with patient.)  SHORT-TERM FUNCTIONAL GOALS: Time Frame: 4-8 wks  1. Patient will report 25-50% reduction in overall symptoms (MET)  2. Patient will be compliant with HEP and plan of care (MET)  3.   Patient will have full PROM in the left shoulder as compared to the contralateral side (MET)  4. Patient will improve on the DASH by 8-10 points (MET)  DISCHARGE GOALS: Time Frame: 9-12 wk  1. Patient will report % reduction in overall symptoms in order to be able to have full function with decreased symptoms (ongoing)  2. Patient will be able to lift 10lbs over his head with minimal symptoms  (ongoing)   3. Patient will report being able to do all household and work related activities with minimal symptoms (0-1/10)  (ongoing)  4. Patient will score 25 points or less on the DASH order to demonstrate improved function with less pain (ongoing)     Rehabilitation Potential For Stated Goals: Good    Regarding [address] [facility]. \[facility]"" therapy, I certify that the treatment plan above will be carried out by a therapist or under their direction. Thank you for this referral,  Lars Galvan PT,DPT    Referring Physician Signature: Reji De La Paz MD _____________________________________________________ Date: __________________________________          HISTORY:   History of Present Injury/Illness (Reason for Referral): reports that he was at [employer] about 3 wks ago and fell awkwardly on his left arm. He had immediate pain at that point. He had an MRI which revealed a rotator cuff tear and labral tear. Surgery was on April 13, 2018.    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  Current Medications:  None   Date Last Reviewed:  4-20-18   EXAMINATION:   (Abbreviations: P-pain, NP- no pain, wnl-within normal limits)  Observation/Orthostatic Postural Assessment:    Standing resting posture:  · Forward head posture    Sitting resting posture:  · -  Palpation/tone/tissue texture:    Soft tissue:  · Upper traps: increased tone on L side    ROM:  (P-pain  NP-no pain)  Cervical ROM  (tested in sitting) Date:  6-8-18       Flexion wnl   Extension wnl   Rotation L wnl   Rotation R wnl     Shoulder ROM Date:  6-8-18       Right Left   Flexion wnl 140deg   Abduction wnl 100 deg   IR wnl 65 deg   ER wnl 40 deg            Strength:   Shoulder strength Date:  6-8-18       Right Left   Flexion 5 n/a   Abduction 5 Good activation without increased pain   IR 5 Good activation without increased pain   ER 5 Good activation without increased pain        Scapular stability Date:  6-8-18       Right Left   Mid trap n/a n/a   Low trap n/a n/a   Rhomboids n/a n/a           Special Tests: -    Neurological Screen:   Myotomes: n/a         Dermatomes: n/a         Reflexes: -         Neural Tension Tests: ULTT (median): -  Functional Mobility:      Balance:-     CLINICAL DECISION MAKING:   Outcome Measure: Tool Used: Disabilities of the Arm, Shoulder and Hand (DASH) Questionnaire - Quick Version  Score:  Initial: 42/55  Most Recent: 22/55 (Date: 6-8-18 )   Interpretation of Score: The DASH is designed to measure the activities of daily living in person's with upper extremity dysfunction or pain. Each section is scored on a 1-5 scale, 5 representing the greatest disability. The scores of each section are added together for a total score of 55. Score 11 12-19 20-28 29-37 38-45 46-54 55   Modifier CH CI CJ CK CL CM CN     Medical Necessity:   · Patient is expected to demonstrate progress in strength, range of motion and symptom levels to return to full function. Reason for Services/Other Comments:  · Patient continues to require skilled intervention due to ongoing symptoms. TREATMENT:   (In addition to Assessment/Re-Assessment sessions the following treatments were rendered)    Subjective Pre-Treatment Symptoms: reports his shoulder is doing pretty good. Feels like it is getting better. Therapeutic Exercise: (20 min) Done in order to improve strength, ROM and understanding of current condition. Date  6-6-18 Date  6-8-18   Activity/Exercise     Education  Discussed doing his exercises more frequently    UBE 4/4 level 2 8 min, warm up   Sidelying scapular retraction against manual resistance  Done with forward elevation with scapular control, manual cues for proper contraction   ER isometric Isometric, gentle, at 0 deg, 20 deg, 35 deg, 5 sec hold x 5 ea -   ER Sidelying 2 x 10 · Side-lying, no resistance,10x, 3 sets, manual over pressure for increased motion at end range  · Sitting, yellow band, 10x, 2 sets    shoulder flexion 2 x 10 Supine -20x  Standing- 10x   Standing wand flexion  -   Wall slide into flexion X 20 -   Low Y's  Red band, 15x, 2 sets, w/ ER     Manual Therapy: (25 minutes) Done in order to improve joint and soft tissue mobility,reduce muscle guarding, and decrease muscle tone  · External rotation mobilization at multiple abduction angles (0,45 and 60 deg), grade III-IV  · Forward elevation and abduction mobilization with inferior glide, grade III-IV    Modalities: (  min) Done in order to reduce swelling and pain      Treatment/Session Assessment:   Mr. Jamison Parrish tolerated the session well. His ROM is improving but still has moderate stiffness. Discussed working more frequently on his ROM. · Pain: Initial:    Minimal symptoms at rest Post Session:  Minimal symptoms at rest ·   Compliance with Program/Exercises: compliant   · Recommendations/Intent for next treatment session: \"Next visit will focus on progressing the patients plan of care\".     Future Appointments  Date Time Provider Felix Mcgraw   6/11/2018 9:15 AM Joe Peñaloza PT, DPT [facility]   6/13/2018 9:15 AM Joe Peñaloza PT, DPT [facility]   6/18/2018 9:15 AM [facility]   6/20/2018 9:15 AM [facility] PT [facility]     Total Treatment Duration: 45 min  5 min-re-emerson  PT Patient Time In/Time Out  Time In: 0915  Time Out: 3690

## 2018-06-11 ENCOUNTER — HOSPITAL ENCOUNTER (OUTPATIENT)
Dept: PHYSICAL THERAPY | Age: 38
Discharge: HOME | End: 2018-06-11
Payer: COMMERCIAL

## 2018-06-11 PROCEDURE — 97140 MANUAL THERAPY 1/> REGIONS: CPT

## 2018-06-11 PROCEDURE — 97110 THERAPEUTIC EXERCISES: CPT

## 2018-06-11 NOTE — PROGRESS NOTES
Paola Cole  : 1980  Primary: [facility]  Secondary:  [facility] [place] [address], [address], [place] [address]  [phone]   [phone]         OUTPATIENT PHYSICAL THERAPY:Daily Note 2018    ICD-10: Treatment Diagnosis: COMPLETE ROTATOR CUFF TEAR, NOT SPECIFIED AS TRAUMATIC M75.122;     Precautions/Allergies: sulfa drugs  Fall Risk Score: 1 (? 5 = High Risk)  MD Orders: evaluate and treat MEDICAL/REFERRING DIAGNOSIS:Left shoulder pain [M25.512]  DATE OF ONSET: Date of surgery 2018  REFERRING PHYSICIAN:Baumgarten, Sherrlyn Fallow, MD  RETURN PHYSICIAN APPOINTMENT: did not specify       ASSESSMENT:  Mr. Mark Murray has come for a total of 12 visits since starting physical therapy on 18. During that time his symptoms are gradually improving but he still has moderate ROM restrictions in all planes. He was educated to become more aggressive and compliant with this at home. Skilled physical therapy is recommended to continue progressing his plan of care in order to return him to full function with minimal symptoms. PROBLEM LIST (Impacting functional limitations):  1. Decreased Strength  2. Decreased ADL/Functional Activities  3. Increased Pain  4. Decreased Activity Tolerance  5. Decreased Flexibility/Joint Mobility  6. Decreased [place] with Home Exercise Program INTERVENTIONS PLANNED:  1. Cold  2. Heat  3. Home Exercise Program (HEP)  4. Manual Therapy  5. Range of Motion (ROM)  6. Therapeutic Exercise/Strengthening     TREATMENT PLAN:  Effective Dates: 18 TO 18    Frequency/Duration: 2 times a week for 8 weeks  GOALS: (Goals have been discussed and agreed upon with patient.)  SHORT-TERM FUNCTIONAL GOALS: Time Frame: 4-8 wks  1. Patient will report 25-50% reduction in overall symptoms (MET)  2. Patient will be compliant with HEP and plan of care (MET)  3.   Patient will have full PROM in the left shoulder as compared to the contralateral side (MET)  4. Patient will improve on the DASH by 8-10 points (MET)  DISCHARGE GOALS: Time Frame: 9-12 wk  1. Patient will report % reduction in overall symptoms in order to be able to have full function with decreased symptoms (ongoing)  2. Patient will be able to lift 10lbs over his head with minimal symptoms  (ongoing)   3. Patient will report being able to do all household and work related activities with minimal symptoms (0-1/10)  (ongoing)  4. Patient will score 25 points or less on the DASH order to demonstrate improved function with less pain (ongoing)     Rehabilitation Potential For Stated Goals: Good    Regarding [facility] therapy, I certify that the treatment plan above will be carried out by a therapist or under their direction. Thank you for this referral,  Jeff Winter PT,DPT    Referring Physician Signature: Leim Ormond, MD _____________________________________________________ Date: __________________________________          HISTORY:   History of Present Injury/Illness (Reason for Referral): reports that he was at [employer] about 3 wks ago and fell awkwardly on his left arm. He had immediate pain at that point. He had an MRI which revealed a rotator cuff tear and labral tear. Surgery was on April 13, 2018.    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  Current Medications:  None   Date Last Reviewed:  4-20-18   EXAMINATION:   (Abbreviations: P-pain, NP- no pain, wnl-within normal limits)  Observation/Orthostatic Postural Assessment:    Standing resting posture:  · Forward head posture    Sitting resting posture:  · -  Palpation/tone/tissue texture:    Soft tissue:  · Upper traps: increased tone on L side    ROM:  (P-pain  NP-no pain)  Cervical ROM  (tested in sitting) Date:  6-8-18       Flexion wnl   Extension wnl   Rotation L wnl   Rotation R wnl     Shoulder ROM Date:  6-8-18       Right Left   Flexion wnl 140deg   Abduction wnl 100 deg   IR wnl 65 deg   ER wnl 40 deg            Strength:   Shoulder strength Date:  6-8-18       Right Left   Flexion 5 n/a   Abduction 5 Good activation without increased pain   IR 5 Good activation without increased pain   ER 5 Good activation without increased pain        Scapular stability Date:  6-8-18       Right Left   Mid trap n/a n/a   Low trap n/a n/a   Rhomboids n/a n/a           Special Tests: -    Neurological Screen:   Myotomes: n/a         Dermatomes: n/a         Reflexes: -         Neural Tension Tests: ULTT (median): -  Functional Mobility:      Balance:-     CLINICAL DECISION MAKING:   Outcome Measure: Tool Used: Disabilities of the Arm, Shoulder and Hand (DASH) Questionnaire - Quick Version  Score:  Initial: 42/55  Most Recent: 22/55 (Date: 6-8-18 )   Interpretation of Score: The DASH is designed to measure the activities of daily living in person's with upper extremity dysfunction or pain. Each section is scored on a 1-5 scale, 5 representing the greatest disability. The scores of each section are added together for a total score of 55. Score 11 12-19 20-28 29-37 38-45 46-54 55   Modifier CH CI CJ CK CL CM CN     Medical Necessity:   · Patient is expected to demonstrate progress in strength, range of motion and symptom levels to return to full function. Reason for Services/Other Comments:  · Patient continues to require skilled intervention due to ongoing symptoms. TREATMENT:   (In addition to Assessment/Re-Assessment sessions the following treatments were rendered)    Subjective Pre-Treatment Symptoms: reports that he is doing pretty good. States he was more aggressive over the wknd with the shoulder and only had minor soreness.      Therapeutic Exercise: (20 min) Done in order to improve strength, ROM and understanding of current condition. Date  6-6-18 Date  6-8-18 Date  6-11-18   Activity/Exercise      Education  Discussed doing his exercises more frequently  Discussed hEP   UBE 4/4 level 2 8 min, warm up 8 min warm up   Sidelying scapular retraction against manual resistance  Done with forward elevation with scapular control, manual cues for proper contraction -    isometrics Isometric, gentle, at 0 deg, 20 deg, 35 deg, 5 sec hold x 5 ea - Done at end range ER, IR, and forward elevation, 5 sec, 10x   ER Sidelying 2 x 10 · Side-lying, no resistance,10x, 3 sets, manual over pressure for increased motion at end range  · Sitting, yellow band, 10x, 2 sets · Yellow band, 10x, 3 sets  · Side-lying, 20x, 2 sets, no resistance    shoulder flexion 2 x 10 Supine -20x  Standing- 10x Supine, 1#, 20x, 2 sets   Standing wand flexion  -    Low Y's  Red band, 15x, 2 sets, w/ ER Green band, 10x, 2 sets   IR    Red band, 10x, 3 sets     Manual Therapy: (30 minutes) Done in order to improve joint and soft tissue mobility,reduce muscle guarding, and decrease muscle tone  · External rotation mobilization at multiple abduction angles (0,45 and 60 deg), grade III-IV  · Posterior capsule mobilization with GH internal rotation, grade III-IV  · Forward elevation and abduction mobilization with inferior glide, grade III-IV    Modalities: (  min) Done in order to reduce swelling and pain      Treatment/Session Assessment:   Mr. Zamudio Limb tolerated the session well. Progressing with ROM and strength. Discussed his updated HEP     · Pain: Initial:   No pain reported Post Session:  No pain reported ·   Compliance with Program/Exercises: compliant   · Recommendations/Intent for next treatment session: \"Next visit will focus on progressing the patients plan of care\".     Future Appointments  Date Time Provider Felix Mariluz   6/13/2018 9:15 AM Kailyn Kaur, PT, DPT [facility]   6/18/2018 9:15 AM Kailyn Kaur, PT, DPT [facility]   6/20/2018 9:15 AM Amber Ritchie PT, DPT [facility]     Total Treatment Duration: 55 min  PT Patient Time In/Time Out  Time In: 0920  Time Out: 1385

## 2018-06-13 ENCOUNTER — HOSPITAL ENCOUNTER (OUTPATIENT)
Dept: PHYSICAL THERAPY | Age: 38
Discharge: HOME | End: 2018-06-13
Payer: COMMERCIAL

## 2018-06-13 PROCEDURE — 97140 MANUAL THERAPY 1/> REGIONS: CPT

## 2018-06-13 PROCEDURE — 97110 THERAPEUTIC EXERCISES: CPT

## 2018-06-13 NOTE — PROGRESS NOTES
Elliot Nicole  : 1980  [facility]  [facility]:  [facility] [address], [place] [address]  [phone]   [phone]         OUTPATIENT PHYSICAL THERAPY:Daily Note 2018    ICD-10: Treatment Diagnosis: COMPLETE ROTATOR CUFF TEAR, NOT SPECIFIED AS TRAUMATIC M75.122;     Precautions/Allergies: sulfa drugs  Fall Risk Score: 1 (? 5 = High Risk)  MD Orders: evaluate and treat MEDICAL/REFERRING DIAGNOSIS:Left shoulder pain [M25.512]  DATE OF ONSET: Date of surgery 2018  REFERRING PHYSICIAN:Baumgarten, Donel Medicine, MD  RETURN PHYSICIAN APPOINTMENT: did not specify       ASSESSMENT:  Mr. Ed Mckeon has come for a total of 12 visits since starting physical therapy on 18. During that time his symptoms are gradually improving but he still has moderate ROM restrictions in all planes. He was educated to become more aggressive and compliant with this at home. Skilled physical therapy is recommended to continue progressing his plan of care in order to return him to full function with minimal symptoms. PROBLEM LIST (Impacting functional limitations):  1. Decreased Strength  2. Decreased ADL/Functional Activities  3. Increased Pain  4. Decreased Activity Tolerance  5. Decreased Flexibility/Joint Mobility  6. Decreased [place] with Home Exercise Program INTERVENTIONS PLANNED:  1. Cold  2. Heat  3. Home Exercise Program (HEP)  4. Manual Therapy  5. Range of Motion (ROM)  6. Therapeutic Exercise/Strengthening     TREATMENT PLAN:  Effective Dates: 18 TO 18    Frequency/Duration: 2 times a week for 8 weeks  GOALS: (Goals have been discussed and agreed upon with patient.)  SHORT-TERM FUNCTIONAL GOALS: Time Frame: 4-8 wks  1. Patient will report 25-50% reduction in overall symptoms (MET)  2. Patient will be compliant with HEP and plan of care (MET)  3.   Patient will have full PROM in the left shoulder as compared to the contralateral side (MET)  4. Patient will improve on the DASH by 8-10 points (MET)  DISCHARGE GOALS: Time Frame: 9-12 wk  1. Patient will report % reduction in overall symptoms in order to be able to have full function with decreased symptoms (ongoing)  2. Patient will be able to lift 10lbs over his head with minimal symptoms  (ongoing)   3. Patient will report being able to do all household and work related activities with minimal symptoms (0-1/10)  (ongoing)  4. Patient will score 25 points or less on the DASH order to demonstrate improved function with less pain (ongoing)     Rehabilitation Potential For Stated Goals: Good    Regarding [facility] therapy, I certify that the treatment plan above will be carried out by a therapist or under their direction. Thank you for this referral,  Sunitha Whipple PT,DPT    Referring Physician Signature: Alfonzo Michelle MD _____________________________________________________ Date: __________________________________          HISTORY:   History of Present Injury/Illness (Reason for Referral): reports that he was at [employer] about 3 wks ago and fell awkwardly on his left arm. He had immediate pain at that point. He had an MRI which revealed a rotator cuff tear and labral tear. Surgery was on April 13, 2018.    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  Current Medications:  None   Date Last Reviewed:  4-20-18   EXAMINATION:   (Abbreviations: P-pain, NP- no pain, wnl-within normal limits)  Observation/Orthostatic Postural Assessment:    Standing resting posture:  · Forward head posture    Sitting resting posture:  · -  Palpation/tone/tissue texture:    Soft tissue:  · Upper traps: increased tone on L side    ROM:  (P-pain  NP-no pain)  Cervical ROM  (tested in sitting) Date:  6-8-18       Flexion wnl   Extension wnl   Rotation L wnl   Rotation R wnl     Shoulder ROM Date:  6-8-18       Right Left   Flexion wnl 140deg   Abduction wnl 100 deg   IR wnl 65 deg   ER wnl 40 deg            Strength:   Shoulder strength Date:  6-8-18       Right Left   Flexion 5 n/a   Abduction 5 Good activation without increased pain   IR 5 Good activation without increased pain   ER 5 Good activation without increased pain        Scapular stability Date:  6-8-18       Right Left   Mid trap n/a n/a   Low trap n/a n/a   Rhomboids n/a n/a           Special Tests: -    Neurological Screen:   Myotomes: n/a         Dermatomes: n/a         Reflexes: -         Neural Tension Tests: ULTT (median): -  Functional Mobility:      Balance:-     CLINICAL DECISION MAKING:   Outcome Measure: Tool Used: Disabilities of the Arm, Shoulder and Hand (DASH) Questionnaire - Quick Version  Score:  Initial: 42/55  Most Recent: 22/55 (Date: 6-8-18 )   Interpretation of Score: The DASH is designed to measure the activities of daily living in person's with upper extremity dysfunction or pain. Each section is scored on a 1-5 scale, 5 representing the greatest disability. The scores of each section are added together for a total score of 55. Score 11 12-19 20-28 29-37 38-45 46-54 55   Modifier CH CI CJ CK CL CM CN     Medical Necessity:   · Patient is expected to demonstrate progress in strength, range of motion and symptom levels to return to full function. Reason for Services/Other Comments:  · Patient continues to require skilled intervention due to ongoing symptoms. TREATMENT:   (In addition to Assessment/Re-Assessment sessions the following treatments were rendered)    Subjective Pre-Treatment Symptoms: reports that he is doing pretty good. Continues to work on his home program.     Therapeutic Exercise: (20 min) Done in order to improve strength, ROM and understanding of current condition.     Date  6-8-18 Date  6-11-18 Date  6-13-18   Activity/Exercise      Education Discussed doing his exercises more frequently  Discussed hEP Discussed HEP   UBE 8 min, warm up 8 min warm up 8 min warm up   Sidelying scapular retraction against manual resistance Done with forward elevation with scapular control, manual cues for proper contraction - -    isometrics - Done at end range ER, IR, and forward elevation, 5 sec, 10x Done at end range ER, IR, and forward elevation, 5 sec, 10x   ER · Side-lying, no resistance,10x, 3 sets, manual over pressure for increased motion at end range  · Sitting, yellow band, 10x, 2 sets · Yellow band, 10x, 3 sets  · Side-lying, 20x, 2 sets, no resistance · Yellow band, 10x, 3 sets  · Side-lying, 20x, 2 sets, no resistance    shoulder flexion Supine -20x  Standing- 10x Supine, 1#, 20x, 2 sets Supine, yellow band for ER resistance, 10x, 2 sets   Standing wand flexion -  -   Low Y's Red band, 15x, 2 sets, w/ ER Green band, 10x, 2 sets Green band, 10x, 3 sets   IR   Red band, 10x, 3 sets Green band, 10x, 3 sets     Manual Therapy: (30 minutes) Done in order to improve joint and soft tissue mobility,reduce muscle guarding, and decrease muscle tone  · External rotation mobilization at multiple abduction angles (0,45 and 60 deg), grade III-IV  · Posterior capsule mobilization with GH internal rotation, grade III-IV  · Forward elevation and abduction mobilization with inferior glide, grade III-IV    Modalities: (  min) Done in order to reduce swelling and pain      Treatment/Session Assessment:   Mr. Ligia Edwards tolerated the session well. Motion is still limited but we are being more aggressive in improving mobility. Discussed to continue progressing his HEP and move his arm more throughout the day. · Pain: Initial:   No pain reported Post Session:  No pain reported ·   Compliance with Program/Exercises: compliant   · Recommendations/Intent for next treatment session:  \"Next visit will focus on progressing the patients plan of care\".     Future Appointments  Date Time Provider Felix Mcgraw   6/18/2018 9:15 AM [facility]   6/20/2018 9:15 AM [facility], [facility]     Total Treatment Duration: 50 min  PT Patient Time In/Time Out  Time In: 0915  Time Out: 1010

## 2018-06-18 ENCOUNTER — HOSPITAL ENCOUNTER (OUTPATIENT)
Dept: PHYSICAL THERAPY | Age: 38
Discharge: HOME | End: 2018-06-18
Payer: COMMERCIAL

## 2018-06-18 PROCEDURE — 97110 THERAPEUTIC EXERCISES: CPT

## 2018-06-18 PROCEDURE — 97140 MANUAL THERAPY 1/> REGIONS: CPT

## 2018-06-18 NOTE — PROGRESS NOTES
Korey Funk  : 1980  Primary: Alexsander Michel Of [facility]hem[facility] Amaya*  Secondary:  [facility]  [address] , [address], [place] [address]  [phone]   [phone]         OUTPATIENT PHYSICAL THERAPY:Daily Note 2018    ICD-10: Treatment Diagnosis: COMPLETE ROTATOR CUFF TEAR, NOT SPECIFIED AS TRAUMATIC M75.122;     Precautions/Allergies: sulfa drugs  Fall Risk Score: 1 (? 5 = High Risk)  MD Orders: evaluate and treat MEDICAL/REFERRING DIAGNOSIS:Left shoulder pain [M25.512]  DATE OF ONSET: Date of surgery 2018  REFERRING PHYSICIAN:Baumgarten, Ranell Providence, MD  RETURN PHYSICIAN APPOINTMENT: did not specify       ASSESSMENT:  Mr. Tin Han has come for a total of 12 visits since starting physical therapy on 18. During that time his symptoms are gradually improving but he still has moderate ROM restrictions in all planes. He was educated to become more aggressive and compliant with this at home. Skilled physical therapy is recommended to continue progressing his plan of care in order to return him to full function with minimal symptoms. PROBLEM LIST (Impacting functional limitations):  1. Decreased Strength  2. Decreased ADL/Functional Activities  3. Increased Pain  4. Decreased Activity Tolerance  5. Decreased Flexibility/Joint Mobility  6. Decreased [place] with Home Exercise Program INTERVENTIONS PLANNED:  1. Cold  2. Heat  3. Home Exercise Program (HEP)  4. Manual Therapy  5. Range of Motion (ROM)  6. Therapeutic Exercise/Strengthening     TREATMENT PLAN:  Effective Dates: 18 TO 18    Frequency/Duration: 2 times a week for 8 weeks  GOALS: (Goals have been discussed and agreed upon with patient.)  SHORT-TERM FUNCTIONAL GOALS: Time Frame: 4-8 wks  1. Patient will report 25-50% reduction in overall symptoms (MET)  2. Patient will be compliant with HEP and plan of care (MET)  3.   Patient will have full PROM in the left shoulder as compared to the contralateral side (MET)  4. Patient will improve on the DASH by 8-10 points (MET)  DISCHARGE GOALS: Time Frame: 9-12 wk  1. Patient will report % reduction in overall symptoms in order to be able to have full function with decreased symptoms (ongoing)  2. Patient will be able to lift 10lbs over his head with minimal symptoms  (ongoing)   3. Patient will report being able to do all household and work related activities with minimal symptoms (0-1/10)  (ongoing)  4. Patient will score 25 points or less on the DASH order to demonstrate improved function with less pain (ongoing)     Rehabilitation Potential For Stated Goals: Good    Regarding [address] [facility] Susan. \[facility].\"" therapy, I certify that the treatment plan above will be carried out by a therapist or under their direction. Thank you for this referral,  Saumya Mai PT,DPT    Referring Physician Signature: Chelsy Joseph MD _____________________________________________________ Date: __________________________________          HISTORY:   History of Present Injury/Illness (Reason for Referral): reports that he was at [employer] about 3 wks ago and fell awkwardly on his left arm. He had immediate pain at that point. He had an MRI which revealed a rotator cuff tear and labral tear. Surgery was on April 13, 2018.    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  Current Medications:  None   Date Last Reviewed:  4-20-18   EXAMINATION:   (Abbreviations: P-pain, NP- no pain, wnl-within normal limits)  Observation/Orthostatic Postural Assessment:    Standing resting posture:  · Forward head posture    Sitting resting posture:  · -  Palpation/tone/tissue texture:    Soft tissue:  · Upper traps: increased tone on L side    ROM:  (P-pain  NP-no pain)  Cervical ROM  (tested in sitting) Date:  6-8-18       Flexion wnl   Extension wnl   Rotation L wnl   Rotation R wnl     Shoulder ROM Date:  6-8-18       Right Left   Flexion wnl 140deg   Abduction wnl 100 deg   IR wnl 65 deg   ER wnl 40 deg            Strength:   Shoulder strength Date:  6-8-18       Right Left   Flexion 5 n/a   Abduction 5 Good activation without increased pain   IR 5 Good activation without increased pain   ER 5 Good activation without increased pain        Scapular stability Date:  6-8-18       Right Left   Mid trap n/a n/a   Low trap n/a n/a   Rhomboids n/a n/a           Special Tests: -    Neurological Screen:   Myotomes: n/a         Dermatomes: n/a         Reflexes: -         Neural Tension Tests: ULTT (median): -  Functional Mobility:      Balance:-     CLINICAL DECISION MAKING:   Outcome Measure: Tool Used: Disabilities of the Arm, Shoulder and Hand (DASH) Questionnaire - Quick Version  Score:  Initial: 42/55  Most Recent: 22/55 (Date: 6-8-18 )   Interpretation of Score: The DASH is designed to measure the activities of daily living in person's with upper extremity dysfunction or pain. Each section is scored on a 1-5 scale, 5 representing the greatest disability. The scores of each section are added together for a total score of 55. Score 11 12-19 20-28 29-37 38-45 46-54 55   Modifier CH CI CJ CK CL CM CN     Medical Necessity:   · Patient is expected to demonstrate progress in strength, range of motion and symptom levels to return to full function. Reason for Services/Other Comments:  · Patient continues to require skilled intervention due to ongoing symptoms. TREATMENT:   (In addition to Assessment/Re-Assessment sessions the following treatments were rendered)    Subjective Pre-Treatment Symptoms: reports that he is a little more from using his arm more often but overall doing well. Therapeutic Exercise: (20 min) Done in order to improve strength, ROM and understanding of current condition.     Date  6-11-18 Date  6-13-18 Date  6-18-18   Activity/Exercise      Education Discussed hEP Discussed HEP · Discussed HEP  · Discussed sleeper stretch   UBE 8 min warm up 8 min warm up 8 min    Sidelying scapular retraction against manual resistance - - -    isometrics Done at end range ER, IR, and forward elevation, 5 sec, 10x Done at end range ER, IR, and forward elevation, 5 sec, 10x    ER · Yellow band, 10x, 3 sets  · Side-lying, 20x, 2 sets, no resistance · Yellow band, 10x, 3 sets  · Side-lying, 20x, 2 sets, no resistance · Yellow band, 10x, 3 sets  · Side-lying, 10x, 3 sets, 1#    shoulder forward elevation Supine, 1#, 20x, 2 sets Supine, yellow band for ER resistance, 10x, 2 sets · Supine, red band for ER resistance, 10x, 2 sets  · Standing,1#, 10x, 3 sets   Low Y's Green band, 10x, 2 sets Green band, 10x, 3 sets    IR  Red band, 10x, 3 sets Green band, 10x, 3 sets Green band, 10x, 3 sets     Manual Therapy: (30 minutes) Done in order to improve joint and soft tissue mobility,reduce muscle guarding, and decrease muscle tone  · External rotation mobilization at multiple abduction angles (0,45 and 60 deg), grade III-IV  · Posterior capsule mobilization with GH internal rotation, grade III-IV  · Forward elevation and abduction mobilization with inferior glide, grade III-IV    Modalities: (  min) Done in order to reduce swelling and pain      Treatment/Session Assessment:   Mr. Jaxon Argueta tolerated the session well. Continues to progress with ROM and strength. Discussed to continue using the arm more frequently throughout the day. · Pain: Initial:   No pain reported Post Session:  No pain reported ·   Compliance with Program/Exercises: compliant   · Recommendations/Intent for next treatment session: \"Next visit will focus on progressing the patients plan of care\".     Future Appointments  Date Time Provider Felix Mcgraw   6/20/2018 9:15 AM Christianne Garvin PT, DPT [facility]     Total Treatment Duration: 50 min  PT Patient Time In/Time Out  Time In: 0967  Time Out: 1010

## 2018-06-25 ENCOUNTER — HOSPITAL ENCOUNTER (OUTPATIENT)
Dept: PHYSICAL THERAPY | Age: 38
Discharge: HOME | End: 2018-06-25
Payer: COMMERCIAL

## 2018-06-25 PROCEDURE — 97110 THERAPEUTIC EXERCISES: CPT

## 2018-06-25 PROCEDURE — 97140 MANUAL THERAPY 1/> REGIONS: CPT

## 2018-06-25 NOTE — PROGRESS NOTES
Sil Frederick  : 1980  Primary: Yeny Debbie Orlin Amaya*  [place]:  [facility]  [address], [address], [place] [address]  [phone]   [phone]         OUTPATIENT PHYSICAL THERAPY:Daily Note 2018    ICD-10: Treatment Diagnosis: COMPLETE ROTATOR CUFF TEAR, NOT SPECIFIED AS TRAUMATIC M75.122;     Precautions/Allergies: sulfa drugs  Fall Risk Score: 1 (? 5 = High Risk)  MD Orders: evaluate and treat MEDICAL/REFERRING DIAGNOSIS:Left shoulder pain [M25.512]  DATE OF ONSET: Date of surgery 2018  REFERRING PHYSICIAN:Baumgarten, Charolet Bear, MD  RETURN PHYSICIAN APPOINTMENT: did not specify       ASSESSMENT:  Mr. Jamison Parrish has come for a total of 12 visits since starting physical therapy on 18. During that time his symptoms are gradually improving but he still has moderate ROM restrictions in all planes. He was educated to become more aggressive and compliant with this at home. Skilled physical therapy is recommended to continue progressing his plan of care in order to return him to full function with minimal symptoms. PROBLEM LIST (Impacting functional limitations):  1. Decreased Strength  2. Decreased ADL/Functional Activities  3. Increased Pain  4. Decreased Activity Tolerance  5. Decreased Flexibility/Joint Mobility  6. Decreased [place] with Home Exercise Program INTERVENTIONS PLANNED:  1. Cold  2. Heat  3. Home Exercise Program (HEP)  4. Manual Therapy  5. Range of Motion (ROM)  6. Therapeutic Exercise/Strengthening     TREATMENT PLAN:  Effective Dates: 18 TO 18    Frequency/Duration: 2 times a week for 8 weeks  GOALS: (Goals have been discussed and agreed upon with patient.)  SHORT-TERM FUNCTIONAL GOALS: Time Frame: 4-8 wks  1. Patient will report 25-50% reduction in overall symptoms (MET)  2. Patient will be compliant with HEP and plan of care (MET)  3.   Patient will have full PROM in the left shoulder as compared to the contralateral side (MET)  4. Patient will improve on the DASH by 8-10 points (MET)  DISCHARGE GOALS: Time Frame: 9-12 wk  1. Patient will report % reduction in overall symptoms in order to be able to have full function with decreased symptoms (ongoing)  2. Patient will be able to lift 10lbs over his head with minimal symptoms  (ongoing)   3. Patient will report being able to do all household and work related activities with minimal symptoms (0-1/10)  (ongoing)  4. Patient will score 25 points or less on the DASH order to demonstrate improved function with less pain (ongoing)     Rehabilitation Potential For Stated Goals: Good    Regarding [facility] therapy, I certify that the treatment plan above will be carried out by a therapist or under their direction. Thank you for this referral,  Hansamarlyn Patient PT,DPT    Referring Physician Signature: Donny Crowe MD _____________________________________________________ Date: __________________________________          HISTORY:   History of Present Injury/Illness (Reason for Referral): reports that he was at [employer] about 3 wks ago and fell awkwardly on his left arm. He had immediate pain at that point. He had an MRI which revealed a rotator cuff tear and labral tear. Surgery was on April 13, 2018.    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  Current Medications:  None   Date Last Reviewed:  4-20-18   EXAMINATION:   (Abbreviations: P-pain, NP- no pain, wnl-within normal limits)  Observation/Orthostatic Postural Assessment:    Standing resting posture:  · Forward head posture    Sitting resting posture:  · -  Palpation/tone/tissue texture:    Soft tissue:  · Upper traps: increased tone on L side    ROM:  (P-pain  NP-no pain)  Cervical ROM  (tested in sitting) Date:  6-8-18       Flexion wnl   Extension wnl   Rotation L wnl   Rotation R wnl     Shoulder ROM Date:  6-8-18       Right Left   Flexion wnl 140deg   Abduction wnl 100 deg   IR wnl 65 deg   ER wnl 40 deg            Strength:   Shoulder strength Date:  6-8-18       Right Left   Flexion 5 n/a   Abduction 5 Good activation without increased pain   IR 5 Good activation without increased pain   ER 5 Good activation without increased pain        Scapular stability Date:  6-8-18       Right Left   Mid trap n/a n/a   Low trap n/a n/a   Rhomboids n/a n/a           Special Tests: -    Neurological Screen:   Myotomes: n/a         Dermatomes: n/a         Reflexes: -         Neural Tension Tests: ULTT (median): -  Functional Mobility:      Balance:-     CLINICAL DECISION MAKING:   Outcome Measure: Tool Used: Disabilities of the Arm, Shoulder and Hand (DASH) Questionnaire - Quick Version  Score:  Initial: 42/55  Most Recent: 22/55 (Date: 6-8-18 )   Interpretation of Score: The DASH is designed to measure the activities of daily living in person's with upper extremity dysfunction or pain. Each section is scored on a 1-5 scale, 5 representing the greatest disability. The scores of each section are added together for a total score of 55. Score 11 12-19 20-28 29-37 38-45 46-54 55   Modifier CH CI CJ CK CL CM CN     Medical Necessity:   · Patient is expected to demonstrate progress in strength, range of motion and symptom levels to return to full function. Reason for Services/Other Comments:  · Patient continues to require skilled intervention due to ongoing symptoms. TREATMENT:   (In addition to Assessment/Re-Assessment sessions the following treatments were rendered)    Subjective Pre-Treatment Symptoms: reports that he is a little sore but has been much more active. Therapeutic Exercise: (30 min) Done in order to improve strength, ROM and understanding of current condition.     Date  6-13-18 Date  6-18-18 Date  6-25-18   Activity/Exercise      Education Discussed HEP · Discussed HEP  · Discussed sleeper stretch · Discussed HEP   UBE 8 min warm up 8 min  8 min   Sidelying scapular retraction against manual resistance - - -    isometrics Done at end range ER, IR, and forward elevation, 5 sec, 10x  End range ER and IR, 10 sec holds, 10x   ER · Yellow band, 10x, 3 sets  · Side-lying, 20x, 2 sets, no resistance · Yellow band, 10x, 3 sets  · Side-lying, 10x, 3 sets, 1# · red band, 10x, 3 sets  · Side-lying, 10x, 3 sets, 2#    shoulder forward elevation Supine, yellow band for ER resistance, 10x, 2 sets · Supine, red band for ER resistance, 10x, 2 sets  · Standing,1#, 10x, 3 sets · Supine, red band for ER resistance, 10x, 2 sets  · Standing,1#, 10x, 3 sets   Low Y's Green band, 10x, 3 sets  Green band, 10x, 2 sets   IR  Green band, 10x, 3 sets Green band, 10x, 3 sets Green band, 10x, 3 sets     Manual Therapy: (20 minutes) Done in order to improve joint and soft tissue mobility,reduce muscle guarding, and decrease muscle tone  · External rotation mobilization at multiple abduction angles (0,45 and 60 deg), grade III-IV  · Posterior capsule mobilization with GH internal rotation, grade III-IV  · Forward elevation and abduction mobilization with inferior glide, grade III-IV    Modalities: (  min) Done in order to reduce swelling and pain      Treatment/Session Assessment:   Mr. Paradise Garcia tolerated the session well. Progressing well overall but still has tightness in the inferior capsule. Discussed progression of his home program and plan of care. · Pain: Initial:   No pain reported Post Session:  No pain reported ·   Compliance with Program/Exercises: compliant   · Recommendations/Intent for next treatment session: \"Next visit will focus on progressing the patients plan of care\".     Future Appointments  Date Time Provider Felix Mcgraw   6/27/2018 10:30 AM Ryan Gibbs PT, DPT [facility]   7/2/2018 1:45 PM Kailyn Kaur PT, DPT [facility]   7/9/2018 1:45 PM Kailyn Kaur PT, DPT [facility]   7/20/2018 1:00 PM [facility]     Total Treatment Duration: 50 min  PT Patient Time In/Time Out  Time In: 1515  Time Out: 0367

## 2018-06-27 ENCOUNTER — HOSPITAL ENCOUNTER (OUTPATIENT)
Dept: PHYSICAL THERAPY | Age: 38
Discharge: HOME | End: 2018-06-27
Payer: COMMERCIAL

## 2018-06-27 PROCEDURE — 97140 MANUAL THERAPY 1/> REGIONS: CPT

## 2018-06-27 PROCEDURE — 97110 THERAPEUTIC EXERCISES: CPT

## 2018-06-27 NOTE — PROGRESS NOTES
Tina Ramos  : 1980  Primary: [facility]  [facility]:  [facility] [address], [place] [address]  [phone]   [phone]         OUTPATIENT PHYSICAL THERAPY:Daily Note 2018    ICD-10: Treatment Diagnosis: COMPLETE ROTATOR CUFF TEAR, NOT SPECIFIED AS TRAUMATIC M75.122;     Precautions/Allergies: sulfa drugs  Fall Risk Score: 1 (? 5 = High Risk)  MD Orders: evaluate and treat MEDICAL/REFERRING DIAGNOSIS:Left shoulder pain [M25.512]  DATE OF ONSET: Date of surgery 2018  REFERRING PHYSICIAN:Baumgarten, Caryl Catena, MD  RETURN PHYSICIAN APPOINTMENT: did not specify       ASSESSMENT:  Mr. Paulette Reich has come for a total of 12 visits since starting physical therapy on 18. During that time his symptoms are gradually improving but he still has moderate ROM restrictions in all planes. He was educated to become more aggressive and compliant with this at home. Skilled physical therapy is recommended to continue progressing his plan of care in order to return him to full function with minimal symptoms. PROBLEM LIST (Impacting functional limitations):  1. Decreased Strength  2. Decreased ADL/Functional Activities  3. Increased Pain  4. Decreased Activity Tolerance  5. Decreased Flexibility/Joint Mobility  6. Decreased [place] with Home Exercise Program INTERVENTIONS PLANNED:  1. Cold  2. Heat  3. Home Exercise Program (HEP)  4. Manual Therapy  5. Range of Motion (ROM)  6. Therapeutic Exercise/Strengthening     TREATMENT PLAN:  Effective Dates: 18 TO 18    Frequency/Duration: 2 times a week for 8 weeks  GOALS: (Goals have been discussed and agreed upon with patient.)  SHORT-TERM FUNCTIONAL GOALS: Time Frame: 4-8 wks  1. Patient will report 25-50% reduction in overall symptoms (MET)  2. Patient will be compliant with HEP and plan of care (MET)  3.   Patient will have full PROM in the left shoulder as compared to the contralateral side (MET)  4. Patient will improve on the DASH by 8-10 points (MET)  DISCHARGE GOALS: Time Frame: 9-12 wk  1. Patient will report % reduction in overall symptoms in order to be able to have full function with decreased symptoms (ongoing)  2. Patient will be able to lift 10lbs over his head with minimal symptoms  (ongoing)   3. Patient will report being able to do all household and work related activities with minimal symptoms (0-1/10)  (ongoing)  4. Patient will score 25 points or less on the DASH order to demonstrate improved function with less pain (ongoing)     Rehabilitation Potential For Stated Goals: Good    Regarding [facility] therapy, I certify that the treatment plan above will be carried out by a therapist or under their direction. Thank you for this referral,  Lex Castleman PT,DPT    Referring Physician Signature: Sherry Garcia MD _____________________________________________________ Date: __________________________________          HISTORY:   History of Present Injury/Illness (Reason for Referral): reports that he was at [employer] about 3 wks ago and fell awkwardly on his left arm. He had immediate pain at that point. He had an MRI which revealed a rotator cuff tear and labral tear. Surgery was on April 13, 2018.    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  Current Medications:  None   Date Last Reviewed:  4-20-18   EXAMINATION:   (Abbreviations: P-pain, NP- no pain, wnl-within normal limits)  Observation/Orthostatic Postural Assessment:    Standing resting posture:  · Forward head posture    Sitting resting posture:  · -  Palpation/tone/tissue texture:    Soft tissue:  · Upper traps: increased tone on L side    ROM:  (P-pain  NP-no pain)  Cervical ROM  (tested in sitting) Date:  6-8-18       Flexion wnl   Extension wnl   Rotation L wnl   Rotation R wnl     Shoulder ROM Date:  6-8-18       Right Left   Flexion wnl 140deg   Abduction wnl 100 deg   IR wnl 65 deg   ER wnl 40 deg            Strength:   Shoulder strength Date:  6-8-18       Right Left   Flexion 5 n/a   Abduction 5 Good activation without increased pain   IR 5 Good activation without increased pain   ER 5 Good activation without increased pain        Scapular stability Date:  6-8-18       Right Left   Mid trap n/a n/a   Low trap n/a n/a   Rhomboids n/a n/a           Special Tests: -    Neurological Screen:   Myotomes: n/a         Dermatomes: n/a         Reflexes: -         Neural Tension Tests: ULTT (median): -  Functional Mobility:      Balance:-     CLINICAL DECISION MAKING:   Outcome Measure: Tool Used: Disabilities of the Arm, Shoulder and Hand (DASH) Questionnaire - Quick Version  Score:  Initial: 42/55  Most Recent: 22/55 (Date: 6-8-18 )   Interpretation of Score: The DASH is designed to measure the activities of daily living in person's with upper extremity dysfunction or pain. Each section is scored on a 1-5 scale, 5 representing the greatest disability. The scores of each section are added together for a total score of 55. Score 11 12-19 20-28 29-37 38-45 46-54 55   Modifier CH CI CJ CK CL CM CN     Medical Necessity:   · Patient is expected to demonstrate progress in strength, range of motion and symptom levels to return to full function. Reason for Services/Other Comments:  · Patient continues to require skilled intervention due to ongoing symptoms. TREATMENT:   (In addition to Assessment/Re-Assessment sessions the following treatments were rendered)    Subjective Pre-Treatment Symptoms: reports that he went to the gym this morning and feels good afterward; patient reports no pain today    Therapeutic Exercise: (35 min) Done in order to improve strength, ROM and understanding of current condition. Date  6-18-18 Date  6-25-18 Date  6-27-18   Activity/Exercise      Education · Discussed HEP  · Discussed sleeper stretch · Discussed HEP Nothing updated   UBE 8 min  8 min 8 min   Sidelying scapular retraction against manual resistance - - -    isometrics  End range ER and IR, 10 sec holds, 10x End-range IR and ER, 10 sec holds x 5   ER · Yellow band, 10x, 3 sets  · Side-lying, 10x, 3 sets, 1# · red band, 10x, 3 sets  · Side-lying, 10x, 3 sets, 2# · Green band 10x, 3 sets with tactile cues   · Sidelying 10x, 3 sets, 2#    shoulder forward elevation · Supine, red band for ER resistance, 10x, 2 sets  · Standing,1#, 10x, 3 sets · Supine, red band for ER resistance, 10x, 2 sets  · Standing,2#, 10x, 3 sets · Standing, red band for ER resistance, 10x 3 sets  · Standing, 2#, 10x3 sets with tactile cues for shoulder position    Low Y's  Green band, 10x, 2 sets    IR  Green band, 10x, 3 sets Green band, 10x, 3 sets Green band, 10x, 3 sets   Rhythmic perturbations in quadruped   Forward and lateral reaches with right arm, with verbal and tactile cues to maintain left shoulder position and neutral thoracic and cervical spine, 15 reps without resistance, 2x10 reps with yellow band      Manual Therapy: (20 minutes) Done in order to improve joint and soft tissue mobility,reduce muscle guarding, and decrease muscle tone  · External rotation mobilization at multiple abduction angles (0,45 and 60 deg), grade III-IV  · Posterior capsule mobilization with GH internal rotation, grade III-IV  · Forward elevation and abduction mobilization with inferior glide, grade III-IV    Modalities: (  min) Done in order to reduce swelling and pain      Treatment/Session Assessment:   Mr. Peter Ann tolerated the session well. Patient reports no pain after PT session. Progressing well overall but still has tightness in the inferior and anterior capsule.      · Pain: Initial:   No pain reported Post Session:  No pain reported ·   Compliance with Program/Exercises: compliant   · Recommendations/Intent for next treatment session: \"Next visit will focus on progressing the patients plan of care\".     Future Appointments  Date Time Provider Felix Mcgraw   7/2/2018 1:45 PM Venkat Alvarenga PT, DPT [facility]   7/9/2018 1:45 PM Venkat Alvarenga PT, MARK [facility]   7/20/2018 1:00 PM Venkat Alvarenga PT, DPT [facility]     Total Treatment Duration: 60 min  PT Patient Time In/Time Out  Time In: 1430  Time Out: 0050

## 2018-07-02 ENCOUNTER — HOSPITAL ENCOUNTER (OUTPATIENT)
Dept: PHYSICAL THERAPY | Age: 38
Discharge: HOME | End: 2018-07-02
Payer: COMMERCIAL

## 2018-07-02 PROCEDURE — 97110 THERAPEUTIC EXERCISES: CPT

## 2018-07-02 PROCEDURE — 97140 MANUAL THERAPY 1/> REGIONS: CPT

## 2018-07-02 NOTE — PROGRESS NOTES
Nancy Wall  : 1980  Primary: [facility]  Secondary:  [facility] [place] [address] [place] [address], [address], [place] [address]  [phone]   [phone]         OUTPATIENT PHYSICAL THERAPY:Daily Note 2018    ICD-10: Treatment Diagnosis: COMPLETE ROTATOR CUFF TEAR, NOT SPECIFIED AS TRAUMATIC M75.122;     Precautions/Allergies: sulfa drugs  Fall Risk Score: 1 (? 5 = High Risk)  MD Orders: evaluate and treat MEDICAL/REFERRING DIAGNOSIS:Left shoulder pain [M25.512]  DATE OF ONSET: Date of surgery 2018  REFERRING PHYSICIAN:Baumgarten, Inis [place], MD  RETURN PHYSICIAN APPOINTMENT: did not specify       ASSESSMENT:  Mr. Manpreet Figueroa has come for a total of 12 visits since starting physical therapy on 18. During that time his symptoms are gradually improving but he still has moderate ROM restrictions in all planes. He was educated to become more aggressive and compliant with this at home. Skilled physical therapy is recommended to continue progressing his plan of care in order to return him to full function with minimal symptoms. PROBLEM LIST (Impacting functional limitations):  1. Decreased Strength  2. Decreased ADL/Functional Activities  3. Increased Pain  4. Decreased Activity Tolerance  5. Decreased Flexibility/Joint Mobility  6. Decreased [place] with Home Exercise Program INTERVENTIONS PLANNED:  1. Cold  2. Heat  3. Home Exercise Program (HEP)  4. Manual Therapy  5. Range of Motion (ROM)  6. Therapeutic Exercise/Strengthening     TREATMENT PLAN:  Effective Dates: 18 TO 18    Frequency/Duration: 2 times a week for 8 weeks  GOALS: (Goals have been discussed and agreed upon with patient.)  SHORT-TERM FUNCTIONAL GOALS: Time Frame: 4-8 wks  1. Patient will report 25-50% reduction in overall symptoms (MET)  2. Patient will be compliant with HEP and plan of care (MET)  3.   Patient will have full PROM in the left shoulder as compared to the contralateral side (MET)  4. Patient will improve on the DASH by 8-10 points (MET)  DISCHARGE GOALS: Time Frame: 9-12 wk  1. Patient will report % reduction in overall symptoms in order to be able to have full function with decreased symptoms (ongoing)  2. Patient will be able to lift 10lbs over his head with minimal symptoms  (ongoing)   3. Patient will report being able to do all household and work related activities with minimal symptoms (0-1/10)  (ongoing)  4. Patient will score 25 points or less on the DASH order to demonstrate improved function with less pain (ongoing)     Rehabilitation Potential For Stated Goals: Good    Regarding [address] [place] [address] therapy, I certify that the treatment plan above will be carried out by a therapist or under their direction. Thank you for this referral,  Jessica Sheffield PT,DPT    Referring Physician Signature: Tasha Collins MD _____________________________________________________ Date: __________________________________          HISTORY:   History of Present Injury/Illness (Reason for Referral): reports that he was at [employer] about 3 wks ago and fell awkwardly on his left arm. He had immediate pain at that point. He had an MRI which revealed a rotator cuff tear and labral tear. Surgery was on April 13, 2018.    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  Current Medications:  None   Date Last Reviewed:  4-20-18   EXAMINATION:   (Abbreviations: P-pain, NP- no pain, wnl-within normal limits)  Observation/Orthostatic Postural Assessment:    Standing resting posture:  · Forward head posture    Sitting resting posture:  · -  Palpation/tone/tissue texture:    Soft tissue:  · Upper traps: increased tone on L side    ROM:  (P-pain  NP-no pain)  Cervical ROM  (tested in sitting) Date:  6-8-18       Flexion wnl   Extension wnl   Rotation L wnl   Rotation R wnl     Shoulder ROM Date:  6-8-18       Right Left   Flexion wnl 140deg   Abduction wnl 100 deg   IR wnl 65 deg   ER wnl 40 deg            Strength:   Shoulder strength Date:  6-8-18       Right Left   Flexion 5 n/a   Abduction 5 Good activation without increased pain   IR 5 Good activation without increased pain   ER 5 Good activation without increased pain        Scapular stability Date:  6-8-18       Right Left   Mid trap n/a n/a   Low trap n/a n/a   Rhomboids n/a n/a           Special Tests: -    Neurological Screen:   Myotomes: n/a         Dermatomes: n/a         Reflexes: -         Neural Tension Tests: ULTT (median): -  Functional Mobility:      Balance:-     CLINICAL DECISION MAKING:   Outcome Measure: Tool Used: Disabilities of the Arm, Shoulder and Hand (DASH) Questionnaire - Quick Version  Score:  Initial: 42/55  Most Recent: 22/55 (Date: 6-8-18 )   Interpretation of Score: The DASH is designed to measure the activities of daily living in person's with upper extremity dysfunction or pain. Each section is scored on a 1-5 scale, 5 representing the greatest disability. The scores of each section are added together for a total score of 55. Score 11 12-19 20-28 29-37 38-45 46-54 55   Modifier CH CI CJ CK CL CM CN     Medical Necessity:   · Patient is expected to demonstrate progress in strength, range of motion and symptom levels to return to full function. Reason for Services/Other Comments:  · Patient continues to require skilled intervention due to ongoing symptoms. TREATMENT:   (In addition to Assessment/Re-Assessment sessions the following treatments were rendered)    Subjective Pre-Treatment Symptoms: reports that he was mildly sore after last appointment but generally felt good. Therapeutic Exercise: (35 min) Done in order to improve strength, ROM and understanding of current condition.     Date  6-25-18 Date  6-27-18 Date  7-2-18   Activity/Exercise      Education · Discussed HEP Nothing updated · Discussed HEP  · Instructed patient on D1/D2 patterns with red band to add to HEP   UBE 8 min 8 min 10 min   Sidelying scapular retraction against manual resistance - - -    isometrics End range ER and IR, 10 sec holds, 10x End-range IR and ER, 10 sec holds x 5 -   ER · red band, 10x, 3 sets  · Side-lying, 10x, 3 sets, 2# · Green band 10x, 3 sets with tactile cues   · Sidelying 10x, 3 sets, 2# -    shoulder forward elevation · Supine, red band for ER resistance, 10x, 2 sets  · Standing,2#, 10x, 3 sets · Standing, red band for ER resistance, 10x 3 sets  · Standing, 2#, 10x3 sets with tactile cues for shoulder position  -   Low Y's Green band, 10x, 2 sets     IR  Green band, 10x, 3 sets Green band, 10x, 3 sets    Rhythmic stabilization in quadruped  Forward and lateral reaches with right arm, with verbal and tactile cues to maintain left shoulder position and neutral thoracic and cervical spine, 15 reps without resistance, 2x10 reps with yellow band  Forward and lateral reaches with red band at wrists and perturbations at scapula, 3 sets of 10 reps bilaterally   D1/D2 with manual resistance   2 sets of 5x, concentric and eccentric with resistance at forearm      Manual Therapy: (20 minutes) Done in order to improve joint and soft tissue mobility,reduce muscle guarding, and decrease muscle tone  · External rotation mobilization at multiple abduction angles (0,45 and 60 deg), grade III-IV  · Posterior capsule mobilization with GH internal rotation, grade III-IV  · Forward elevation and abduction mobilization with inferior glide, grade III-IV    Modalities: (  min) Done in order to reduce swelling and pain      Treatment/Session Assessment:   Mr. Abbe Epperson tolerated the session well. Patient reports no pain after PT session. Progressing well overall but still has tightness in the inferior and anterior capsule.      · Pain: Initial:   No pain reported Post Session:  No pain reported ·   Compliance with Program/Exercises: compliant   · Recommendations/Intent for next treatment session: \"Next visit will focus on progressing the patients plan of care\".     Future Appointments  Date Time Provider Felix Mcgraw   7/9/2018 1:45 PM [facility]   7/20/2018 1:00 PM [facility]     Total Treatment Duration: 55 min  PT Patient Time In/Time Out  Time In: 4351  Time Out: 8291

## 2018-07-09 ENCOUNTER — HOSPITAL ENCOUNTER (OUTPATIENT)
Dept: PHYSICAL THERAPY | Age: 38
Discharge: HOME | End: 2018-07-09
Payer: COMMERCIAL

## 2018-07-09 PROCEDURE — 97140 MANUAL THERAPY 1/> REGIONS: CPT

## 2018-07-09 PROCEDURE — 97110 THERAPEUTIC EXERCISES: CPT

## 2018-07-09 NOTE — PROGRESS NOTES
Dhruv Schwartz  : 1980  Primary: [facility]on[facility] Orte[facility] Am[facility]*  [facility]:  [facility], [address], [place] [address]  [phone]   [phone]         OUTPATIENT PHYSICAL THERAPY:Daily Note 2018    ICD-10: Treatment Diagnosis: COMPLETE ROTATOR CUFF TEAR, NOT SPECIFIED AS TRAUMATIC M75.122;     Precautions/Allergies: sulfa drugs  Fall Risk Score: 1 (? 5 = High Risk)  MD Orders: evaluate and treat MEDICAL/REFERRING DIAGNOSIS:Left shoulder pain [M25.512]  DATE OF ONSET: Date of surgery 2018  REFERRING PHYSICIAN:Baumgarten, Marceline Och, MD  RETURN PHYSICIAN APPOINTMENT: did not specify       ASSESSMENT:  Mr. Kady Cardenas has come for a total of 12 visits since starting physical therapy on 18. During that time his symptoms are gradually improving but he still has moderate ROM restrictions in all planes. He was educated to become more aggressive and compliant with this at home. Skilled physical therapy is recommended to continue progressing his plan of care in order to return him to full function with minimal symptoms. PROBLEM LIST (Impacting functional limitations):  1. Decreased Strength  2. Decreased ADL/Functional Activities  3. Increased Pain  4. Decreased Activity Tolerance  5. Decreased Flexibility/Joint Mobility  6. Decreased [place] with Home Exercise Program INTERVENTIONS PLANNED:  1. Cold  2. Heat  3. Home Exercise Program (HEP)  4. Manual Therapy  5. Range of Motion (ROM)  6. Therapeutic Exercise/Strengthening     TREATMENT PLAN:  Effective Dates: 18 TO 18    Frequency/Duration: 2 times a week for 8 weeks  GOALS: (Goals have been discussed and agreed upon with patient.)  SHORT-TERM FUNCTIONAL GOALS: Time Frame: 4-8 wks  1. Patient will report 25-50% reduction in overall symptoms (MET)  2. Patient will be compliant with HEP and plan of care (MET)  3.   Patient will have full PROM in the left shoulder as compared to the contralateral side (MET)  4. Patient will improve on the DASH by 8-10 points (MET)  DISCHARGE GOALS: Time Frame: 9-12 wk  1. Patient will report % reduction in overall symptoms in order to be able to have full function with decreased symptoms (ongoing)  2. Patient will be able to lift 10lbs over his head with minimal symptoms  (ongoing)   3. Patient will report being able to do all household and work related activities with minimal symptoms (0-1/10)  (ongoing)  4. Patient will score 25 points or less on the DASH order to demonstrate improved function with less pain (ongoing)     Rehabilitation Potential For Stated Goals: Good    Regarding [facility] therapy, I certify that the treatment plan above will be carried out by a therapist or under their direction. Thank you for this referral,  Chan Mcgregor PT,DPT    Referring Physician Signature: Joe Marx MD _____________________________________________________ Date: __________________________________          HISTORY:   History of Present Injury/Illness (Reason for Referral): reports that he was at [employer] about 3 wks ago and fell awkwardly on his left arm. He had immediate pain at that point. He had an MRI which revealed a rotator cuff tear and labral tear. Surgery was on April 13, 2018.    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  Current Medications:  None   Date Last Reviewed:  4-20-18   EXAMINATION:   (Abbreviations: P-pain, NP- no pain, wnl-within normal limits)  Observation/Orthostatic Postural Assessment:    Standing resting posture:  · Forward head posture    Sitting resting posture:  · -  Palpation/tone/tissue texture:    Soft tissue:  · Upper traps: increased tone on L side    ROM:  (P-pain  NP-no pain)  Cervical ROM  (tested in sitting) Date:  6-8-18       Flexion wnl   Extension wnl   Rotation L wnl   Rotation R wnl     Shoulder ROM Date:  6-8-18       Right Left   Flexion wnl 140deg   Abduction wnl 100 deg   IR wnl 65 deg   ER wnl 40 deg            Strength:   Shoulder strength Date:  6-8-18       Right Left   Flexion 5 n/a   Abduction 5 Good activation without increased pain   IR 5 Good activation without increased pain   ER 5 Good activation without increased pain        Scapular stability Date:  6-8-18       Right Left   Mid trap n/a n/a   Low trap n/a n/a   Rhomboids n/a n/a           Special Tests: -    Neurological Screen:   Myotomes: n/a         Dermatomes: n/a         Reflexes: -         Neural Tension Tests: ULTT (median): -  Functional Mobility:      Balance:-     CLINICAL DECISION MAKING:   Outcome Measure: Tool Used: Disabilities of the Arm, Shoulder and Hand (DASH) Questionnaire - Quick Version  Score:  Initial: 42/55  Most Recent: 22/55 (Date: 6-8-18 )   Interpretation of Score: The DASH is designed to measure the activities of daily living in person's with upper extremity dysfunction or pain. Each section is scored on a 1-5 scale, 5 representing the greatest disability. The scores of each section are added together for a total score of 55. Score 11 12-19 20-28 29-37 38-45 46-54 55   Modifier CH CI CJ CK CL CM CN     Medical Necessity:   · Patient is expected to demonstrate progress in strength, range of motion and symptom levels to return to full function. Reason for Services/Other Comments:  · Patient continues to require skilled intervention due to ongoing symptoms. TREATMENT:   (In addition to Assessment/Re-Assessment sessions the following treatments were rendered)    Subjective Pre-Treatment Symptoms: Patient reports no pain today. Patient reports that he was able to do 10 pushups at the gym with minimal pain.     Therapeutic Exercise: (25 min) Done in order to improve strength, ROM and understanding of current condition. Date  6-27-18 Date  7-2-18 Date  7-9-18   Activity/Exercise      Education Nothing updated · Discussed HEP  · Instructed patient on D1/D2 patterns with red band to add to HEP · Discussed HEP   UBE 8 min 10 min 5 min   Sidelying scapular retraction against manual resistance - -     isometrics End-range IR and ER, 10 sec holds x 5 - End-range IR/ER, 10 second holds x 3   ER · Green band 10x, 3 sets with tactile cues   · Sidelying 10x, 3 sets, 2# - Sidelying with 3# weight, 3 sets of 10 to fatigue    shoulder forward elevation · Standing, red band for ER resistance, 10x 3 sets  · Standing, 2#, 10x3 sets with tactile cues for shoulder position  - Standing with red band for ER resistance and 2# weights   Low Y's   -   IR  Green band, 10x, 3 sets  -   Rhythmic stabilization in quadruped Forward and lateral reaches with right arm, with verbal and tactile cues to maintain left shoulder position and neutral thoracic and cervical spine, 15 reps without resistance, 2x10 reps with yellow band  Forward and lateral reaches with red band at wrists and perturbations at scapula, 3 sets of 10 reps bilaterally -   D1/D2 with manual resistance  2 sets of 5x, concentric and eccentric with resistance at forearm  2 sets of 3x, concentric and eccentric with resistance at forearm     Manual Therapy: (20 minutes) Done in order to improve joint and soft tissue mobility,reduce muscle guarding, and decrease muscle tone  · External rotation mobilization at multiple abduction angles (0,45 and 60 deg), grade III-IV  · Posterior capsule mobilization with [address] internal rotation, grade III-IV  · Forward elevation and abduction mobilization with inferior glide, grade III-IV    Modalities: (  min) Done in order to reduce swelling and pain      Treatment/Session Assessment:   Mr. Liliana Pride tolerated the session well. Patient reports no pain after PT session. Progressing well overall but still has tightness in the anterior capsule. · Pain: Initial:   No pain reported Post Session:  No pain reported ·   Compliance with Program/Exercises: compliant   · Recommendations/Intent for next treatment session: \"Next visit will focus on progressing the patients plan of care\".     Future Appointments  Date Time Provider Felix Mcgraw   7/20/2018 1:00 PM Clarence Chávez, PT, DPT [facility]     Total Treatment Duration: 45 min  PT Patient Time In/Time Out  Time In: 1345  Time Out: 1438

## 2018-08-03 ENCOUNTER — HOSPITAL ENCOUNTER (OUTPATIENT)
Dept: PHYSICAL THERAPY | Age: 38
Discharge: HOME | End: 2018-08-03
Payer: COMMERCIAL

## 2018-08-03 PROCEDURE — 97140 MANUAL THERAPY 1/> REGIONS: CPT

## 2018-08-03 PROCEDURE — 97110 THERAPEUTIC EXERCISES: CPT

## 2018-08-03 NOTE — PROGRESS NOTES
Valdo Tamayo  : 1980  Primary: Omar Collier [facility]*  Secondary:  [facility], [address], [place] [address]  [phone]   [phone]      RE-EVAL NEXT VISIT   OUTPATIENT PHYSICAL THERAPY:Daily Note 8/3/2018    ICD-10: Treatment Diagnosis: COMPLETE ROTATOR CUFF TEAR, NOT SPECIFIED AS TRAUMATIC M75.122;     Precautions/Allergies: sulfa drugs  Fall Risk Score: 1 (? 5 = High Risk)  MD Orders: evaluate and treat MEDICAL/REFERRING DIAGNOSIS:Left shoulder pain [M25.512]  DATE OF ONSET: Date of surgery 2018  REFERRING PHYSICIAN:Baumgarten, Erik Babcock, MD  RETURN PHYSICIAN APPOINTMENT: did not specify       ASSESSMENT:  Mr. Yvrose Liang has come for a total of 12 visits since starting physical therapy on 18. During that time his symptoms are gradually improving but he still has moderate ROM restrictions in all planes. He was educated to become more aggressive and compliant with this at home. Skilled physical therapy is recommended to continue progressing his plan of care in order to return him to full function with minimal symptoms. PROBLEM LIST (Impacting functional limitations):  1. Decreased Strength  2. Decreased ADL/Functional Activities  3. Increased Pain  4. Decreased Activity Tolerance  5. Decreased Flexibility/Joint Mobility  6. Decreased [place] with Home Exercise Program INTERVENTIONS PLANNED:  1. Cold  2. Heat  3. Home Exercise Program (HEP)  4. Manual Therapy  5. Range of Motion (ROM)  6. Therapeutic Exercise/Strengthening     TREATMENT PLAN:  Effective Dates: 18 TO 18    Frequency/Duration: 2 times a week for 8 weeks  GOALS: (Goals have been discussed and agreed upon with patient.)  SHORT-TERM FUNCTIONAL GOALS: Time Frame: 4-8 wks  1. Patient will report 25-50% reduction in overall symptoms (MET)  2. Patient will be compliant with HEP and plan of care (MET)  3.   Patient will have full PROM in the left shoulder as compared to the contralateral side (MET)  4. Patient will improve on the DASH by 8-10 points (MET)  DISCHARGE GOALS: Time Frame: 9-12 wk  1. Patient will report % reduction in overall symptoms in order to be able to have full function with decreased symptoms (ongoing)  2. Patient will be able to lift 10lbs over his head with minimal symptoms  (ongoing)   3. Patient will report being able to do all household and work related activities with minimal symptoms (0-1/10)  (ongoing)  4. Patient will score 25 points or less on the DASH order to demonstrate improved function with less pain (ongoing)     Rehabilitation Potential For Stated Goals: Good    Regarding [facility] therapy, I certify that the treatment plan above will be carried out by a therapist or under their direction. Thank you for this referral,  Chan Mcgregor PT,DPT    Referring Physician Signature: Joe Marx MD _____________________________________________________ Date: __________________________________          HISTORY:   History of Present Injury/Illness (Reason for Referral): reports that he was at [employer] about 3 wks ago and fell awkwardly on his left arm. He had immediate pain at that point. He had an MRI which revealed a rotator cuff tear and labral tear. Surgery was on April 13, 2018.    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  Current Medications:  None   Date Last Reviewed:  4-20-18   EXAMINATION:   (Abbreviations: P-pain, NP- no pain, wnl-within normal limits)  Observation/Orthostatic Postural Assessment:    Standing resting posture:  · Forward head posture    Sitting resting posture:  · -  Palpation/tone/tissue texture:    Soft tissue:  · Upper traps: increased tone on L side    ROM:  (P-pain  NP-no pain)  Cervical ROM  (tested in sitting) Date:  6-8-18       Flexion wnl   Extension wnl   Rotation L wnl   Rotation R wnl     Shoulder ROM Date:  6-8-18       Right Left   Flexion wnl 140deg   Abduction wnl 100 deg   IR wnl 65 deg   ER wnl 40 deg            Strength:   Shoulder strength Date:  6-8-18       Right Left   Flexion 5 n/a   Abduction 5 Good activation without increased pain   IR 5 Good activation without increased pain   ER 5 Good activation without increased pain        Scapular stability Date:  6-8-18       Right Left   Mid trap n/a n/a   Low trap n/a n/a   Rhomboids n/a n/a           Special Tests: -    Neurological Screen:   Myotomes: n/a         Dermatomes: n/a         Reflexes: -         Neural Tension Tests: ULTT (median): -  Functional Mobility:      Balance:-     CLINICAL DECISION MAKING:   Outcome Measure: Tool Used: Disabilities of the Arm, Shoulder and Hand (DASH) Questionnaire - Quick Version  Score:  Initial: 42/55  Most Recent: 22/55 (Date: 6-8-18 )   Interpretation of Score: The DASH is designed to measure the activities of daily living in person's with upper extremity dysfunction or pain. Each section is scored on a 1-5 scale, 5 representing the greatest disability. The scores of each section are added together for a total score of 55. Score 11 12-19 20-28 29-37 38-45 46-54 55   Modifier CH CI CJ CK CL CM CN     Medical Necessity:   · Patient is expected to demonstrate progress in strength, range of motion and symptom levels to return to full function. Reason for Services/Other Comments:  · Patient continues to require skilled intervention due to ongoing symptoms. TREATMENT:   (In addition to Assessment/Re-Assessment sessions the following treatments were rendered)    Subjective Pre-Treatment Symptoms: Patient reports no pain today.  Patient reports that he was travelling a lot over the past 2 weeks and that his shoulder felt relatively good, but that he was not able to do much exercise during this time. Therapeutic Exercise: (20 min) Done in order to improve strength, ROM and understanding of current condition. Date  7-2-18 Date  7-9-18 Date  8-3-18   Activity/Exercise      Education · Discussed HEP  · Instructed patient on D1/D2 patterns with red band to add to HEP · Discussed HEP · Discussed HEP   UBE 10 min 5 min 6 min   Sidelying scapular retraction against manual resistance -      isometrics - End-range IR/ER, 10 second holds x 3 End range IR/ER, 10 second holds x3   ER - Sidelying with 3# weight, 3 sets of 10 to fatigue Sidelying with 2# weight, 3 sets of 10 to fatigue    shoulder forward elevation - Standing with red band for ER resistance and 2# weights Prone on physioball with 1# weights, 3 sets of 10 with tactile cues for scapular depression   Low Y's  -    IR   -    Rhythmic stabilization in quadruped Forward and lateral reaches with red band at wrists and perturbations at scapula, 3 sets of 10 reps bilaterally - Forward and lateral reaches with red band and perturbations at scapula, 2 sets of 10   Bodyblade   Overhead, 2x 1 minute  Diagonal pattern x 3 reps   D1/D2 with manual resistance 2 sets of 5x, concentric and eccentric with resistance at forearm  2 sets of 3x, concentric and eccentric with resistance at forearm -     Manual Therapy: (25 minutes) Done in order to improve joint and soft tissue mobility,reduce muscle guarding, and decrease muscle tone  · External rotation mobilization at multiple abduction angles (0,45 and 60 deg), grade III-IV  · Posterior capsule mobilization with [address] internal rotation, grade III-IV  · Forward elevation and abduction mobilization with inferior glide, grade III-IV    Modalities: (  min) Done in order to reduce swelling and pain      Treatment/Session Assessment:   Mr. Nasim Palacios tolerated the session well. Patient reports no pain after PT session. Progressing well overall, but still has limitation in anterior capsule.  Discussed decreasing frequency of PT visits to 1x/week. Patient reports he feels ~70% better than initial eval.      · Pain: Initial:   No pain reported Post Session:  No pain reported ·   Compliance with Program/Exercises: compliant   · Recommendations/Intent for next treatment session: \"Next visit will focus on progressing the patients plan of care\". No future appointments.   Total Treatment Duration: 45 min  PT Patient Time In/Time Out  Time In: 1430  Time Out: [facility], [facility]

## 2018-08-08 ENCOUNTER — HOSPITAL ENCOUNTER (OUTPATIENT)
Dept: PHYSICAL THERAPY | Age: 38
Discharge: HOME | End: 2018-08-08
Payer: COMMERCIAL

## 2018-08-08 PROCEDURE — 97140 MANUAL THERAPY 1/> REGIONS: CPT

## 2018-08-08 PROCEDURE — 97110 THERAPEUTIC EXERCISES: CPT

## 2018-08-08 NOTE — PROGRESS NOTES
Kristy Redd  : 1980  Primary: Vicente Galvin [facility] Amaya*  Secondary:  [facility] [place] [address] [place] [address], [address], [address]  [phone]   [phone]      RE-EVAL NEXT VISIT   OUTPATIENT PHYSICAL THERAPY:Daily Note 2018    ICD-10: Treatment Diagnosis: COMPLETE ROTATOR CUFF TEAR, NOT SPECIFIED AS TRAUMATIC M75.122;     Precautions/Allergies: sulfa drugs  Fall Risk Score: 1 (? 5 = High Risk)  MD Orders: evaluate and treat MEDICAL/REFERRING DIAGNOSIS:Left shoulder pain [M25.512]  DATE OF ONSET: Date of surgery 2018  REFERRING PHYSICIAN:Baumgarten, Renette Pebbles, MD  RETURN PHYSICIAN APPOINTMENT: did not specify       ASSESSMENT:  Mr. Ericka Joy has come for a total of 12 visits since starting physical therapy on 18. During that time his symptoms are gradually improving but he still has moderate ROM restrictions in all planes. He was educated to become more aggressive and compliant with this at home. Skilled physical therapy is recommended to continue progressing his plan of care in order to return him to full function with minimal symptoms. PROBLEM LIST (Impacting functional limitations):  1. Decreased Strength  2. Decreased ADL/Functional Activities  3. Increased Pain  4. Decreased Activity Tolerance  5. Decreased Flexibility/Joint Mobility  6. Decreased [place] with Home Exercise Program INTERVENTIONS PLANNED:  1. Cold  2. Heat  3. Home Exercise Program (HEP)  4. Manual Therapy  5. Range of Motion (ROM)  6. Therapeutic Exercise/Strengthening     TREATMENT PLAN:  Effective Dates: 18 TO 18    Frequency/Duration: 2 times a week for 8 weeks  GOALS: (Goals have been discussed and agreed upon with patient.)  SHORT-TERM FUNCTIONAL GOALS: Time Frame: 4-8 wks  1. Patient will report 25-50% reduction in overall symptoms (MET)  2. Patient will be compliant with HEP and plan of care (MET)  3.   Patient will have full PROM in the left shoulder as compared to the contralateral side (MET)  4. Patient will improve on the DASH by 8-10 points (MET)  DISCHARGE GOALS: Time Frame: 9-12 wk  1. Patient will report % reduction in overall symptoms in order to be able to have full function with decreased symptoms (ongoing)  2. Patient will be able to lift 10lbs over his head with minimal symptoms  (ongoing)   3. Patient will report being able to do all household and work related activities with minimal symptoms (0-1/10)  (ongoing)  4. Patient will score 25 points or less on the DASH order to demonstrate improved function with less pain (ongoing)     Rehabilitation Potential For Stated Goals: Good    Regarding [facility] therapy, I certify that the treatment plan above will be carried out by a therapist or under their direction. Thank you for this referral,  Chan Mcgregor PT,DPT    Referring Physician Signature: Joe Marx MD _____________________________________________________ Date: __________________________________          HISTORY:   History of Present Injury/Illness (Reason for Referral): reports that he was at [employer] about 3 wks ago and fell awkwardly on his left arm. He had immediate pain at that point. He had an MRI which revealed a rotator cuff tear and labral tear. Surgery was on April 13, 2018.    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  Current Medications:  None   Date Last Reviewed:  4-20-18   EXAMINATION:   (Abbreviations: P-pain, NP- no pain, wnl-within normal limits)  Observation/Orthostatic Postural Assessment:    Standing resting posture:  · Forward head posture    Sitting resting posture:  · -  Palpation/tone/tissue texture:    Soft tissue:  · Upper traps: increased tone on L side    ROM:  (P-pain  NP-no pain)  Cervical ROM  (tested in sitting) Date:  6-8-18       Flexion wnl   Extension wnl   Rotation L wnl   Rotation R wnl     Shoulder ROM Date:  6-8-18       Right Left   Flexion wnl 140deg   Abduction wnl 100 deg   IR wnl 65 deg   ER wnl 40 deg            Strength:   Shoulder strength Date:  6-8-18       Right Left   Flexion 5 n/a   Abduction 5 Good activation without increased pain   IR 5 Good activation without increased pain   ER 5 Good activation without increased pain        Scapular stability Date:  6-8-18       Right Left   Mid trap n/a n/a   Low trap n/a n/a   Rhomboids n/a n/a           Special Tests: -    Neurological Screen:   Myotomes: n/a         Dermatomes: n/a         Reflexes: -         Neural Tension Tests: ULTT (median): -  Functional Mobility:      Balance:-     CLINICAL DECISION MAKING:   Outcome Measure: Tool Used: Disabilities of the Arm, Shoulder and Hand (DASH) Questionnaire - Quick Version  Score:  Initial: 42/55  Most Recent: 22/55 (Date: 6-8-18 )   Interpretation of Score: The DASH is designed to measure the activities of daily living in person's with upper extremity dysfunction or pain. Each section is scored on a 1-5 scale, 5 representing the greatest disability. The scores of each section are added together for a total score of 55. Score 11 12-19 20-28 29-37 38-45 46-54 55   Modifier CH CI CJ CK CL CM CN     Medical Necessity:   · Patient is expected to demonstrate progress in strength, range of motion and symptom levels to return to full function. Reason for Services/Other Comments:  · Patient continues to require skilled intervention due to ongoing symptoms. TREATMENT:   (In addition to Assessment/Re-Assessment sessions the following treatments were rendered)    Subjective Pre-Treatment Symptoms: Patient reports no pain today. Patient reports that he has been able to push himself more with weightlifting without flare ups or pain.     Therapeutic Exercise: (30 min) Done in order to improve strength, ROM and understanding of current condition. Date  7-9-18 Date  8-3-18 Date  8-8-18   Activity/Exercise      Education · Discussed HEP · Discussed HEP · Discussed HEP   UBE 5 min 6 min 8 minutes   Sidelying scapular retraction against manual resistance   -    isometrics End-range IR/ER, 10 second holds x 3 End range IR/ER, 10 second holds x3 -   ER Sidelying with 3# weight, 3 sets of 10 to fatigue Sidelying with 2# weight, 3 sets of 10 to fatigue · Sidelying with 2# weight, 2 sets of 10  · Standing with red theraband and arm abducted with cues to keep shoulder down, 2 sets of 10    shoulder forward elevation Standing with red band for ER resistance and 2# weights Prone on physioball with 1# weights, 3 sets of 10 with tactile cues for scapular depression · With weight on physioball against wall, 2 sets of 5-10   Low Y's -  -   IR  -  -   Rhythmic stabilization in quadruped - Forward and lateral reaches with red band and perturbations at scapula, 2 sets of 10 -   Bodyblade  Overhead, 2x 1 minute  Diagonal pattern x 3 reps -   D1/D2 with manual resistance 2 sets of 3x, concentric and eccentric with resistance at forearm - D2 with red theraband, D1 with green theraband, 3 sets of 10 each with cues for proper technique      Manual Therapy: (15 minutes) Done in order to improve joint and soft tissue mobility,reduce muscle guarding, and decrease muscle tone  · External rotation mobilization at multiple abduction angles (0,45 and 60 deg), grade III-IV  · Posterior capsule mobilization with [address] internal rotation, grade III-IV  · Forward elevation and abduction mobilization with inferior glide, grade III-IV    Modalities: (  min) Done in order to reduce swelling and pain      Treatment/Session Assessment:   Mr. Gunjan Hdz tolerated the session well. Patient reports no pain after PT session. Still has limitation in anterior capsule but is better than last visit.       · Pain: Initial:   No pain reported Post Session: No pain reported ·   Compliance with Program/Exercises: compliant   · Recommendations/Intent for next treatment session: \"Next visit will focus on progressing the patients plan of care\".     Future Appointments  Date Time Provider Felix Mcgraw   8/13/2018 10:45 AM Sudheer Lee PT, DPT [facility]     Total Treatment Duration: 45 min  PT Patient Time In/Time Out  Time In: 1030  Time Out: CHANDRIKA Cochran

## 2018-08-17 ENCOUNTER — HOSPITAL ENCOUNTER (OUTPATIENT)
Dept: PHYSICAL THERAPY | Age: 38
Discharge: HOME | End: 2018-08-17
Payer: COMMERCIAL

## 2018-08-17 PROCEDURE — 97140 MANUAL THERAPY 1/> REGIONS: CPT

## 2018-08-17 PROCEDURE — 97110 THERAPEUTIC EXERCISES: CPT

## 2018-08-17 NOTE — THERAPY RECERTIFICATION
Chaz Swann  : 1980  Primary: Eduardo Griffin [facility]*  Secondary:  [facility]  [place] , [address], [address]  [phone]   [phone]         OUTPATIENT PHYSICAL THERAPY:Daily Note and Re-evaluation 2018    ICD-10: Treatment Diagnosis: COMPLETE ROTATOR CUFF TEAR, NOT SPECIFIED AS TRAUMATIC M75.122;     Precautions/Allergies: sulfa drugs  Fall Risk Score: 1 (? 5 = High Risk)  MD Orders: evaluate and treat MEDICAL/REFERRING DIAGNOSIS:Left shoulder pain [M25.512]  DATE OF ONSET: Date of surgery 2018  REFERRING PHYSICIAN:Baumgarten, Baker Fees, MD  RETURN PHYSICIAN APPOINTMENT: did not specify       ASSESSMENT:  Mr. David Ayala has come for a total of 22 visits since starting physical therapy on 18. During that time his symptoms are gradually improving, his ROM is progressing but still most limited in ER at lower ranges. He reports feeling about 80% better from his initial eval and desires to keep coming to therapy to ensure he is progressing at a good pace. He is compliant with HEP and making good gains in strength and ROM with home program, but will still require guidance on progressing exercises and ROM. Skilled physical therapy is recommended to continue progressing his plan of care in order to return him to full function with minimal symptoms. PROBLEM LIST (Impacting functional limitations):  1. Decreased Strength  2. Decreased ADL/Functional Activities  3. Increased Pain  4. Decreased Activity Tolerance  5. Decreased Flexibility/Joint Mobility  6. Decreased [place] with Home Exercise Program INTERVENTIONS PLANNED:  1. Cold  2. Heat  3. Home Exercise Program (HEP)  4. Manual Therapy  5. Range of Motion (ROM)  6.  Therapeutic Exercise/Strengthening     TREATMENT PLAN:  Effective Dates: 18 TO 10-17-18    Frequency/Duration: Once every 2 weeks for 8 weeks  GOALS: (Goals have been discussed and agreed upon with patient.)  SHORT-TERM FUNCTIONAL GOALS: Time Frame: 4-8 wks  1. Patient will report 25-50% reduction in overall symptoms (MET)  2. Patient will be compliant with HEP and plan of care (MET)  3. Patient will have full PROM in the left shoulder as compared to the contralateral side (MET)  4. Patient will improve on the DASH by 8-10 points (MET)  DISCHARGE GOALS: Time Frame: 9-12 wk  1. Patient will report % reduction in overall symptoms in order to be able to have full function with decreased symptoms (MET)  2. Patient will be able to lift 10lbs over his head with minimal symptoms  (MET)  3. Patient will report being able to do all household and work related activities with minimal symptoms (0-1/10)  (ongoing)  4. Patient will score 25 points or less on the DASH order to demonstrate improved function with less pain (MET)    Rehabilitation Potential For Stated Goals: Good    Regarding [address] [place] [address] therapy, I certify that the treatment plan above will be carried out by a therapist or under their direction. Thank you for this referral,  Rk Watts PT,DPT    Referring Physician Signature: Chris Pineda MD _____________________________________________________ Date: __________________________________          HISTORY:   History of Present Injury/Illness (Reason for Referral): reports that he was at [employer] about 3 wks ago and fell awkwardly on his left arm. He had immediate pain at that point. He had an MRI which revealed a rotator cuff tear and labral tear. Surgery was on April 13, 2018.    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  Current Medications:  None   Date Last Reviewed:  4-20-18   EXAMINATION:   (Abbreviations: P-pain, NP- no pain, wnl-within normal limits)  Observation/Orthostatic Postural Assessment: Standing resting posture:  · Forward head posture    Sitting resting posture:  · -  Palpation/tone/tissue texture:    Soft tissue:  · Upper traps: increased tone on L side    ROM:  (P-pain  NP-no pain)  Cervical ROM  (tested in sitting) Date:  8-17-18       Flexion wnl   Extension wnl   Rotation L wnl   Rotation R wnl     Shoulder ROM Date:  8-17-18       Right Left   Flexion wnl 155 deg   Abduction wnl 170 deg   IR wnl 72 deg   ER wnl 44 deg            Strength:   Shoulder strength Date:  8-17-18       Right Left   Flexion 5 5   Abduction 5 5   IR 5 5   ER 5 4 at neutral abduction, progressively weaker in higher ranges of abduction        Scapular stability Date:  8-17-18       Right Left   Mid trap 5 5   Low trap 4 5   Rhomboids n/a n/a           Special Tests: -    Neurological Screen:   Myotomes: n/a         Dermatomes: n/a         Reflexes: -         Neural Tension Tests: ULTT (median): -  Functional Mobility:      Balance:-     CLINICAL DECISION MAKING:   Outcome Measure: Tool Used: Disabilities of the Arm, Shoulder and Hand (DASH) Questionnaire - Quick Version  Score:  Initial: 42/55 22/55 (Date: 6-8-18 ) Most Recent: (15/55) on 8-17-18   Interpretation of Score: The DASH is designed to measure the activities of daily living in person's with upper extremity dysfunction or pain. Each section is scored on a 1-5 scale, 5 representing the greatest disability. The scores of each section are added together for a total score of 55. Score 11 12-19 20-28 29-37 38-45 46-54 55   Modifier CH CI CJ CK CL CM CN     Medical Necessity:   · Patient is expected to demonstrate progress in strength, range of motion and symptom levels to return to full function. Reason for Services/Other Comments:  · Patient continues to require skilled intervention due to ongoing symptoms.    TREATMENT:   (In addition to Assessment/Re-Assessment sessions the following treatments were rendered)    Subjective Pre-Treatment Symptoms: Patient reports no pain today. Patient reports that he has been able to work out but still notices some weakness. Reports the times he most notices limitations in motion in his daily life are things like after he works out he has a difficult time removing his shirt overhead. Therapeutic Exercise: (30 min) Done in order to improve strength, ROM and understanding of current condition.     Date  8-3-18 Date  8-8-18 Date  8-17-18   Activity/Exercise      Education · Discussed HEP · Discussed HEP · Discussed/reviewed HEP   UBE 6 min 8 minutes 8 minutes   Sidelying scapular retraction against manual resistance  - -    isometrics End range IR/ER, 10 second holds x3 - -   ER Sidelying with 2# weight, 3 sets of 10 to fatigue · Sidelying with 2# weight, 2 sets of 10  · Standing with red theraband and arm abducted with cues to keep shoulder down, 2 sets of 10 · Standing with 3 lb on cable machine, 10 reps    shoulder forward elevation Prone on physioball with 1# weights, 3 sets of 10 with tactile cues for scapular depression · With weight on physioball against wall, 2 sets of 5-10 · Prone on physioball with 2 lb weight, 10 reps   Low Y's  - -   IR   - -   Rhythmic stabilization in quadruped Forward and lateral reaches with red band and perturbations at scapula, 2 sets of 10 - -   Bodyblade Overhead, 2x 1 minute  Diagonal pattern x 3 reps - -   D1/D2 with manual resistance - D2 with red theraband, D1 with green theraband, 3 sets of 10 each with cues for proper technique  · Supine with manual resistance from PT, ~5 reps each  · Standing with 3# on cable machine, 10 reps each     Manual Therapy: (10 minutes) Done in order to improve joint and soft tissue mobility,reduce muscle guarding, and decrease muscle tone  · External rotation mobilization at multiple abduction angles (0,45 and 60 deg), grade III-IV  · Posterior capsule mobilization with [address] internal rotation, grade III-IV (not today)   · Forward elevation and abduction mobilization with inferior glide, grade III-IV    Modalities: (  min) Done in order to reduce swelling and pain      Treatment/Session Assessment:   Mr. Yue Stiles tolerated the session well. Patient reports no pain after PT session. Still has limitation in anterior capsule but is progressing well and demonstrating good technique with exercises from HEP. · Pain: Initial:   No pain reported Post Session:  No pain reported ·   Compliance with Program/Exercises: compliant   · Recommendations/Intent for next treatment session: \"Next visit will focus on progressing the patients plan of care\".     Future Appointments  Date Time Provider Felix Mcgraw   9/7/2018 2:30 PM Venkat Alvarenga, [facility]     Total Treatment Duration: 40 min treatment, 5 min re-evaluation  PT Patient Time In/Time Out  Time In: 1335  Time Out: [facility] PT,DPT,OCS

## 2018-09-07 ENCOUNTER — HOSPITAL ENCOUNTER (OUTPATIENT)
Dept: PHYSICAL THERAPY | Age: 38
Discharge: HOME | End: 2018-09-07
Payer: COMMERCIAL

## 2018-09-07 PROCEDURE — 97110 THERAPEUTIC EXERCISES: CPT

## 2018-09-07 PROCEDURE — 97140 MANUAL THERAPY 1/> REGIONS: CPT

## 2018-09-07 NOTE — PROGRESS NOTES
Bharat Augustus  : 1980  Primary: Sarah Subramanian [facility] Jose Luis*  Secondary:  [facility]  [facility] [address], [address]  [phone]   [phone]         OUTPATIENT PHYSICAL THERAPY:Daily Note and Discharge 2018    ICD-10: Treatment Diagnosis: COMPLETE ROTATOR CUFF TEAR, NOT SPECIFIED AS TRAUMATIC M75.122;     Precautions/Allergies: sulfa drugs  Fall Risk Score: 1 (? 5 = High Risk)  MD Orders: evaluate and treat MEDICAL/REFERRING DIAGNOSIS:Left shoulder pain [M25.512]  DATE OF ONSET: Date of surgery 2018  REFERRING PHYSICIAN:Baumgarten, Lorriane Chen, MD  RETURN PHYSICIAN APPOINTMENT: did not specify       ASSESSMENT:  Mr. Franchesca Acevedo has come for a total of 23 visits since starting physical therapy on 18. During that time he reports 80% overall improvement and objectively has near full ROM and strength. He still lacks external rotation strength at 90 deg abd, mid and lower trap strength and slight ER motion deficits at 45 deg of scaption. Other than that he has no other impairments and has returned to full activity. He was given an updated HEP to work on those deficits and felt comfortable progressing his own plan of care. At this time he will be discharged until further notice. GOALS: (Goals have been discussed and agreed upon with patient.)  SHORT-TERM FUNCTIONAL GOALS: Time Frame: 4-8 wks  1. Patient will report 25-50% reduction in overall symptoms (MET)  2. Patient will be compliant with HEP and plan of care (MET)  3. Patient will have full PROM in the left shoulder as compared to the contralateral side (MET)  4. Patient will improve on the DASH by 8-10 points (MET)  DISCHARGE GOALS: Time Frame: 9-12 wk  1. Patient will report % reduction in overall symptoms in order to be able to have full function with decreased symptoms (MET)  2.   Patient will be able to lift 10lbs over his head with minimal symptoms (MET)  3. Patient will report being able to do all household and work related activities with minimal symptoms (0-1/10)  (MET)  4. Patient will score 25 points or less on the DASH order to demonstrate improved function with less pain (MET)    Rehabilitation Potential For Stated Goals: Good    Regarding [facility] therapy, I certify that the treatment plan above will be carried out by a therapist or under their direction. Thank you for this referral,  Luis North PT,DPT              HISTORY:   History of Present Injury/Illness (Reason for Referral): reports that he was at [employer] about 3 wks ago and fell awkwardly on his left arm. He had immediate pain at that point. He had an MRI which revealed a rotator cuff tear and labral tear. Surgery was on April 13, 2018.    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  Current Medications:  None   Date Last Reviewed:  4-20-18   EXAMINATION:   (Abbreviations: P-pain, NP- no pain, wnl-within normal limits)  Observation/Orthostatic Postural Assessment:    Standing resting posture:  · Forward head posture    Sitting resting posture:  · -  Palpation/tone/tissue texture:    Soft tissue:  · Upper traps: increased tone on L side    ROM:  (P-pain  NP-no pain)  Cervical ROM  (tested in sitting) Date:  9-7-18       Flexion wnl   Extension wnl   Rotation L wnl   Rotation R wnl     Shoulder ROM Date:  9-7-18       Right Left   Flexion wnl 175 deg   Abduction wnl 175 deg   IR wnl 75 deg   ER wnl 65 deg            Strength:   Shoulder strength Date:  9-7-18       Right Left   Flexion 5 5   Abduction 5 5   IR 5 5   ER 5 4+ in neutral  4 @ 90 deg abd        Scapular stability Date:  9-7-18       Right Left   Mid trap 5 4   Low trap 4 4   Rhomboids n/a n/a           Special Tests: -    Neurological Screen: Myotomes: n/a         Dermatomes: n/a         Reflexes: -         Neural Tension Tests: ULTT (median): -  Functional Mobility:      Balance:-     CLINICAL DECISION MAKING:   Outcome Measure: Tool Used: Disabilities of the Arm, Shoulder and Hand (DASH) Questionnaire - Quick Version  Score:  Initial: 42/55 22/55 (Date: 6-8-18 )  (15/55) on 8-17-18 Most recent: 14/55 (Date 9-7-18)   Interpretation of Score: The DASH is designed to measure the activities of daily living in person's with upper extremity dysfunction or pain. Each section is scored on a 1-5 scale, 5 representing the greatest disability. The scores of each section are added together for a total score of 55. Score 11 12-19 20-28 29-37 38-45 46-54 55   Modifier CH CI CJ CK CL CM CN        TREATMENT:   (In addition to Assessment/Re-Assessment sessions the following treatments were rendered)    Subjective Pre-Treatment Symptoms: Patient reports that he is doing pretty good. Still has some deficits with motion and strength but has been able to work out and function with little limitations     Therapeutic Exercise: (25 min) Done in order to improve strength, ROM and understanding of current condition.     Date  8-8-18 Date  8-17-18 Date  9-7-17   Activity/Exercise      Education · Discussed HEP · Discussed/reviewed HEP · Discussed HEP  · Discussed self mobilization of anterior capsule from 0-45 deg   UBE 8 minutes 8 minutes 5 min for warm up   ER · Sidelying with 2# weight, 2 sets of 10  · Standing with red theraband and arm abducted with cues to keep shoulder down, 2 sets of 10 · Standing with 3 lb on cable machine, 10 reps · At 90 deg, red band, 10x, 2 sets   T's and Y's · With weight on physioball against wall, 2 sets of 5-10 · Prone on physioball with 2 lb weight, 10 reps · 10 for each, Discussed to continue doing   D1/D2 with manual resistance D2 with red theraband, D1 with green theraband, 3 sets of 10 each with cues for proper technique  · Supine with manual resistance from PT, ~5 reps each  · Standing with 3# on cable machine, 10 reps each HEP     Manual Therapy: (15minutes) Done in order to improve joint and soft tissue mobility,reduce muscle guarding, and decrease muscle tone  · External rotation mobilization at multiple abduction angles (0,45 and 60 deg), grade III-IV  · Posterior capsule mobilization with [address] internal rotation, grade III-IV (not today)   · Forward elevation and abduction mobilization with inferior glide, grade III-IV    Modalities: (  min) Done in order to reduce swelling and pain      Treatment/Session Assessment:   Mr. Paulette Reich tolerated the session well. Reported understanding his home program. We will continue following up via e-mail to ensure that he is progressing at home. · Pain: Initial:   No pain reported Post Session:  No pain reported   \". No future appointments.   Total Treatment Duration: 40 min, 5 min re-eval  PT Patient Time In/Time Out  Time In: 1435  Time Out: 88 [facility] PT,DPT,OCS

## (undated) DEVICE — OUTFLOW CASSETTE TUBING, DO NOT USE IF PACKAGE IS DAMAGED: Brand: CROSSFLOW

## (undated) DEVICE — SOFT SILICONE HYDROCELLULAR FOAM DRESSING WITH LOCK AWAY LAYER: Brand: ALLEVYN LIFE XL 21X21 CTN10

## (undated) DEVICE — SUTURE MCRYL SZ 3-0 L27IN ABSRB UD L19MM PS-2 3/8 CIR PRIM Y427H

## (undated) DEVICE — 1.4MM ICONIX DISPOSABLE DRILL: Brand: ICONIX

## (undated) DEVICE — NDL SPNE QNCKE 18GX3.5IN LF --

## (undated) DEVICE — INTENDED FOR TISSUE SEPARATION, AND OTHER PROCEDURES THAT REQUIRE A SHARP SURGICAL BLADE TO PUNCTURE OR CUT.: Brand: BARD-PARKER ® STAINLESS STEEL BLADES

## (undated) DEVICE — SOLUTION IRRIG 3000ML 0.9% SOD CHL FLX CONT 0797208] ICU MEDICAL INC]

## (undated) DEVICE — KENDALL SCD EXPRESS SLEEVES, KNEE LENGTH, MEDIUM: Brand: KENDALL SCD

## (undated) DEVICE — ULTRATAPE 2MM BLUE 38 PKG OF 6

## (undated) DEVICE — PACK,SHOULDER,DRAPE,POUCH: Brand: MEDLINE

## (undated) DEVICE — [AGGRESSIVE 6-FLUTE BARREL BUR, ARTHROSCOPIC SHAVER BLADE,  DO NOT RESTERILIZE,  DO NOT USE IF PACKAGE IS DAMAGED,  KEEP DRY,  KEEP AWAY FROM SUNLIGHT]: Brand: FORMULA

## (undated) DEVICE — CLEAR-TRAC 5.5 MM X 72 MM THREADED                                    CANNULA, WITH DISPOSABLE OBTURATOR,                                    BLUE, STERILE: Brand: CLEAR-TRAC

## (undated) DEVICE — 90-S ACCELERATOR, SUCTION PROBE, NON-BENDABLE, MAX CUT LEVEL 11: Brand: SERFAS ENERGY

## (undated) DEVICE — INFLOW CASSETTE TUBING, DO NOT USE IF PACKAGE IS DAMAGED: Brand: CROSSFLOW

## (undated) DEVICE — 3M™ IOBAN™ 2 ANTIMICROBIAL INCISE DRAPE 6650EZ: Brand: IOBAN™ 2

## (undated) DEVICE — MASTISOL ADHESIVE LIQ 2/3ML

## (undated) DEVICE — Device

## (undated) DEVICE — BLADE SHV CUT MENIS AGG + 4MM --

## (undated) DEVICE — (D)PREP SKN CHLRAPRP APPL 26ML -- CONVERT TO ITEM 371833

## (undated) DEVICE — CANNULA THREADED FLEX 6.5 X 72MM: Brand: CLEAR-TRAC

## (undated) DEVICE — (D)STRIP SKN CLSR 0.5X4IN WHT --

## (undated) DEVICE — ULTRATAPE SUTURE COBRAID BLUE, 6                                    PER BOX: Brand: ULTRATAPE

## (undated) DEVICE — 4-PORT MANIFOLD: Brand: NEPTUNE 2

## (undated) DEVICE — SYR 50ML LR LCK 1ML GRAD NSAF --

## (undated) DEVICE — CLEAR-TRAC 7.0 MM X 72 MM THREADED                                    CANNULA, WITH DISPOSABLE OBTURATOR,                                    GREY, STERILE: Brand: CLEAR-TRAC

## (undated) DEVICE — SURGICAL PROCEDURE PACK BASIC ST FRANCIS

## (undated) DEVICE — TRUEPASS DISPOSABLE NEEDLES 5 PER BOX: Brand: TRUEPASS